# Patient Record
Sex: FEMALE | Race: WHITE | NOT HISPANIC OR LATINO | Employment: OTHER | RURAL
[De-identification: names, ages, dates, MRNs, and addresses within clinical notes are randomized per-mention and may not be internally consistent; named-entity substitution may affect disease eponyms.]

---

## 2018-04-08 PROBLEM — R56.9 SEIZURE: Status: ACTIVE | Noted: 2018-04-08

## 2018-10-18 LAB — BCS RECOMMENDATION EXT: NORMAL

## 2021-11-15 ENCOUNTER — OFFICE VISIT (OUTPATIENT)
Dept: FAMILY MEDICINE | Facility: CLINIC | Age: 66
End: 2021-11-15
Payer: MEDICARE

## 2021-11-15 VITALS
BODY MASS INDEX: 23.75 KG/M2 | OXYGEN SATURATION: 98 % | HEIGHT: 59 IN | HEART RATE: 86 BPM | WEIGHT: 117.81 LBS | TEMPERATURE: 98 F | DIASTOLIC BLOOD PRESSURE: 76 MMHG | SYSTOLIC BLOOD PRESSURE: 127 MMHG | RESPIRATION RATE: 18 BRPM

## 2021-11-15 DIAGNOSIS — E78.2 MIXED HYPERLIPIDEMIA: Primary | ICD-10-CM

## 2021-11-15 PROBLEM — G40.909 SEIZURE DISORDER: Status: ACTIVE | Noted: 2018-12-24

## 2021-11-15 LAB
CHOLEST SERPL-MCNC: 160 MG/DL (ref 0–200)
CHOLEST/HDLC SERPL: 2 {RATIO}
HDLC SERPL-MCNC: 82 MG/DL (ref 40–60)
LDLC SERPL CALC-MCNC: 62 MG/DL
LDLC/HDLC SERPL: 0.8 {RATIO}
NONHDLC SERPL-MCNC: 78 MG/DL
TRIGL SERPL-MCNC: 80 MG/DL (ref 35–150)
VLDLC SERPL-MCNC: 16 MG/DL

## 2021-11-15 PROCEDURE — 80061 LIPID PANEL: CPT | Mod: ,,, | Performed by: CLINICAL MEDICAL LABORATORY

## 2021-11-15 PROCEDURE — 99212 PR OFFICE/OUTPT VISIT, EST, LEVL II, 10-19 MIN: ICD-10-PCS | Mod: ,,, | Performed by: FAMILY MEDICINE

## 2021-11-15 PROCEDURE — 99212 OFFICE O/P EST SF 10 MIN: CPT | Mod: ,,, | Performed by: FAMILY MEDICINE

## 2021-11-15 PROCEDURE — 80061 LIPID PANEL: ICD-10-PCS | Mod: ,,, | Performed by: CLINICAL MEDICAL LABORATORY

## 2021-11-15 RX ORDER — PRAVASTATIN SODIUM 40 MG/1
40 TABLET ORAL DAILY
COMMUNITY
End: 2021-11-15 | Stop reason: SDUPTHER

## 2021-11-15 RX ORDER — NAPROXEN SODIUM 220 MG
220 TABLET ORAL EVERY 12 HOURS PRN
COMMUNITY

## 2021-11-15 RX ORDER — GABAPENTIN 300 MG/1
300 CAPSULE ORAL DAILY
COMMUNITY

## 2021-11-15 RX ORDER — LISINOPRIL 5 MG/1
5 TABLET ORAL DAILY
COMMUNITY
End: 2022-06-03 | Stop reason: SDUPTHER

## 2021-11-15 RX ORDER — DULOXETIN HYDROCHLORIDE 60 MG/1
60 CAPSULE, DELAYED RELEASE ORAL 2 TIMES DAILY
COMMUNITY

## 2021-11-15 RX ORDER — LEVOTHYROXINE SODIUM 75 UG/1
75 TABLET ORAL
COMMUNITY

## 2021-11-15 RX ORDER — PRAVASTATIN SODIUM 40 MG/1
40 TABLET ORAL NIGHTLY
Qty: 90 TABLET | Refills: 3 | Status: SHIPPED | OUTPATIENT
Start: 2021-11-15 | End: 2022-09-01

## 2021-11-15 RX ORDER — AMITRIPTYLINE HYDROCHLORIDE 50 MG/1
50 TABLET, FILM COATED ORAL NIGHTLY
COMMUNITY

## 2021-11-15 RX ORDER — LAMOTRIGINE 150 MG/1
25 TABLET ORAL 2 TIMES DAILY
COMMUNITY

## 2022-03-11 DIAGNOSIS — Z71.89 COMPLEX CARE COORDINATION: ICD-10-CM

## 2022-06-03 ENCOUNTER — OFFICE VISIT (OUTPATIENT)
Dept: FAMILY MEDICINE | Facility: CLINIC | Age: 67
End: 2022-06-03
Payer: MEDICARE

## 2022-06-03 VITALS
SYSTOLIC BLOOD PRESSURE: 138 MMHG | HEART RATE: 70 BPM | WEIGHT: 118.19 LBS | RESPIRATION RATE: 20 BRPM | BODY MASS INDEX: 23.83 KG/M2 | OXYGEN SATURATION: 98 % | HEIGHT: 59 IN | DIASTOLIC BLOOD PRESSURE: 82 MMHG | TEMPERATURE: 97 F

## 2022-06-03 DIAGNOSIS — I10 HYPERTENSION, UNSPECIFIED TYPE: Primary | ICD-10-CM

## 2022-06-03 PROCEDURE — 99213 PR OFFICE/OUTPT VISIT, EST, LEVL III, 20-29 MIN: ICD-10-PCS | Mod: ,,, | Performed by: NURSE PRACTITIONER

## 2022-06-03 PROCEDURE — 99213 OFFICE O/P EST LOW 20 MIN: CPT | Mod: ,,, | Performed by: NURSE PRACTITIONER

## 2022-06-03 RX ORDER — LAMOTRIGINE 25 MG/1
50 TABLET ORAL NIGHTLY
COMMUNITY
Start: 2022-04-13 | End: 2023-07-10

## 2022-06-03 RX ORDER — HYDROXYZINE HYDROCHLORIDE 25 MG/1
25 TABLET, FILM COATED ORAL 4 TIMES DAILY
COMMUNITY
Start: 2022-06-02

## 2022-06-03 NOTE — PROGRESS NOTES
New clinic note    Coco Ramos is a 66 y.o. female     Chief Complaint:   Chief Complaint   Patient presents with    Medication Refill        Subjective:    Patient states she is here for chronic disease follow up and med refill. Patient states all she gets from pcp is htn med. Patient reports neurologist manages other meds. Patient reports bp has been elevated. Patient states at last neuro visit bp was 171/79 (patient brought avs). Patient denies any adverse side effects. Patient denies any complaints or concerns.        Allergies:   Review of patient's allergies indicates:  No Known Allergies     Past Medical History:  Past Medical History:   Diagnosis Date    Anxiety     Hypertension     Hyperthyroidism     Seizures         Current Medications:    Current Outpatient Medications:     amitriptyline (ELAVIL) 50 MG tablet, Take 50 mg by mouth every evening., Disp: , Rfl:     DULoxetine (CYMBALTA) 60 MG capsule, Take 60 mg by mouth 2 (two) times a day., Disp: , Rfl:     gabapentin (NEURONTIN) 300 MG capsule, Take 300 mg by mouth once daily., Disp: , Rfl:     hydrOXYzine HCL (ATARAX) 25 MG tablet, Take 25 mg by mouth 4 (four) times daily., Disp: , Rfl:     lamoTRIgine (LAMICTAL) 150 MG Tab, Take 25 mg by mouth 2 (two) times a day., Disp: , Rfl:     lamoTRIgine (LAMICTAL) 25 MG tablet, Take 50 mg by mouth nightly., Disp: , Rfl:     levothyroxine (SYNTHROID) 75 MCG tablet, Take 75 mcg by mouth before breakfast., Disp: , Rfl:     naproxen sodium (ANAPROX) 220 MG tablet, Take 220 mg by mouth every 12 (twelve) hours as needed., Disp: , Rfl:     pravastatin (PRAVACHOL) 40 MG tablet, Take 1 tablet (40 mg total) by mouth every evening., Disp: 90 tablet, Rfl: 3    lisinopriL 10 MG tablet, Take 1 tablet (10 mg total) by mouth once daily., Disp: 90 tablet, Rfl: 1       Review of Systems   Constitutional: Negative for fever.   Respiratory: Negative for cough and shortness of breath.    Cardiovascular:  "Negative for chest pain.   Gastrointestinal: Negative for abdominal pain.   Neurological: Positive for seizures. Negative for headaches.          Objective:    /82 (BP Location: Right arm, Patient Position: Sitting, BP Method: Large (Manual))   Pulse 70   Temp 97.1 °F (36.2 °C) (Skin)   Resp 20   Ht 4' 11" (1.499 m)   Wt 53.6 kg (118 lb 3.2 oz)   SpO2 98%   BMI 23.87 kg/m²      Physical Exam  Constitutional:       Appearance: Normal appearance.   Eyes:      Extraocular Movements: Extraocular movements intact.   Cardiovascular:      Rate and Rhythm: Normal rate and regular rhythm.      Pulses: Normal pulses.      Heart sounds: Normal heart sounds.   Pulmonary:      Effort: Pulmonary effort is normal.      Breath sounds: Normal breath sounds.   Musculoskeletal:      Right lower leg: No edema.      Left lower leg: No edema.   Neurological:      Mental Status: She is alert and oriented to person, place, and time.          Assessment and Plan:    1. Hypertension, unspecified type         Hypertension, unspecified type  -     Discontinue: lisinopriL (PRINIVIL,ZESTRIL) 5 MG tablet; Take 1 tablet (5 mg total) by mouth once daily.  Dispense: 90 tablet; Refill: 1  -     lisinopriL 10 MG tablet; Take 1 tablet (10 mg total) by mouth once daily.  Dispense: 90 tablet; Refill: 1       bp has been elevated. Mildly elevated at today visit. Will increase lisinopril to 10mg po daily. Encouraged low salt diet. Patient to keep bp record. Patient sees neurologist q3-4 months. If bp consistently elevated 140/90 return for further management. Neurologist collects labs that can be viewed in epic.     There are no Patient Instructions on file for this visit.   Follow up in about 6 months (around 12/3/2022).     "

## 2022-06-06 PROBLEM — G40.909 SEIZURE DISORDER: Chronic | Status: ACTIVE | Noted: 2018-12-24

## 2022-06-06 PROBLEM — I10 HYPERTENSION: Chronic | Status: ACTIVE | Noted: 2022-06-06

## 2022-06-06 PROBLEM — I10 HYPERTENSION: Status: ACTIVE | Noted: 2022-06-06

## 2022-06-06 RX ORDER — LISINOPRIL 10 MG/1
10 TABLET ORAL DAILY
Qty: 90 TABLET | Refills: 1 | Status: SHIPPED | OUTPATIENT
Start: 2022-06-06 | End: 2022-09-01

## 2022-06-06 RX ORDER — LISINOPRIL 5 MG/1
5 TABLET ORAL DAILY
Qty: 90 TABLET | Refills: 1 | Status: SHIPPED | OUTPATIENT
Start: 2022-06-06 | End: 2022-06-06

## 2022-10-09 DIAGNOSIS — Z71.89 COMPLEX CARE COORDINATION: ICD-10-CM

## 2023-03-10 DIAGNOSIS — I10 HYPERTENSION, UNSPECIFIED TYPE: ICD-10-CM

## 2023-03-10 RX ORDER — LISINOPRIL 10 MG/1
10 TABLET ORAL DAILY
Qty: 90 TABLET | Refills: 0 | Status: SHIPPED | OUTPATIENT
Start: 2023-03-10 | End: 2023-07-10 | Stop reason: SDUPTHER

## 2023-03-10 NOTE — TELEPHONE ENCOUNTER
----- Message from Ranjit Price sent at 3/10/2023 10:09 AM CST -----  Regarding: MED REFILL  lisinopriL 10 MG tablet PT NEED THIS MED TO BE REFILL AND SEND TO [facility] ([facility]) - [place], [place] - [address]

## 2023-05-09 DIAGNOSIS — Z71.89 COMPLEX CARE COORDINATION: ICD-10-CM

## 2023-05-11 ENCOUNTER — PATIENT OUTREACH (OUTPATIENT)
Dept: ADMINISTRATIVE | Facility: HOSPITAL | Age: 68
End: 2023-05-11

## 2023-06-12 ENCOUNTER — PATIENT OUTREACH (OUTPATIENT)
Dept: ADMINISTRATIVE | Facility: HOSPITAL | Age: 68
End: 2023-06-12

## 2023-06-12 NOTE — LETTER
June 12, 2023     Coco Ramos  [address] [place] [address]         Dear Mrs. Avery Rachel:      Your [facility] primary care team is dedicated to assisting you achieve your health goals.  Scheduling your routine screenings and tests are key to your good health.  Our records indicate you may be overdue for your screening mammogram.  Mammography screening can help find breast cancer at an early stage, when it is most likely to be successfully treated.    We encourage you to schedule your appointment at any of our [facility] imaging locations.     If you recently had your mammogram outside of [facility], please notify your primary care team so we can update your health record.    If you have questions or want to schedule your screening mammogram, please contact your primary care clinic at [phone].      Sincerely,      Your [facility] Primary Care Team

## 2023-06-12 NOTE — PROGRESS NOTES
Gap report for [facility] mammogram reports. Past due on mammogram. NO upcoming appt scheduled at this time. Letter mailed to patient about scheduling mammo appt.

## 2023-07-10 ENCOUNTER — OFFICE VISIT (OUTPATIENT)
Dept: FAMILY MEDICINE | Facility: CLINIC | Age: 68
End: 2023-07-10
Payer: MEDICARE

## 2023-07-10 VITALS
SYSTOLIC BLOOD PRESSURE: 160 MMHG | HEIGHT: 59 IN | BODY MASS INDEX: 25.44 KG/M2 | TEMPERATURE: 98 F | OXYGEN SATURATION: 95 % | WEIGHT: 126.19 LBS | RESPIRATION RATE: 18 BRPM | DIASTOLIC BLOOD PRESSURE: 68 MMHG | HEART RATE: 85 BPM

## 2023-07-10 DIAGNOSIS — G40.909 SEIZURE DISORDER: Chronic | ICD-10-CM

## 2023-07-10 DIAGNOSIS — I10 HYPERTENSION, UNSPECIFIED TYPE: Primary | ICD-10-CM

## 2023-07-10 DIAGNOSIS — R29.6 FALLS FREQUENTLY: ICD-10-CM

## 2023-07-10 DIAGNOSIS — R26.81 UNSTEADY GAIT: ICD-10-CM

## 2023-07-10 PROCEDURE — 99213 PR OFFICE/OUTPT VISIT, EST, LEVL III, 20-29 MIN: ICD-10-PCS | Mod: ,,, | Performed by: NURSE PRACTITIONER

## 2023-07-10 PROCEDURE — 99213 OFFICE O/P EST LOW 20 MIN: CPT | Mod: ,,, | Performed by: NURSE PRACTITIONER

## 2023-07-10 RX ORDER — LISINOPRIL 20 MG/1
20 TABLET ORAL DAILY
Qty: 90 TABLET | Refills: 1 | Status: SHIPPED | OUTPATIENT
Start: 2023-07-10 | End: 2023-10-12 | Stop reason: SDUPTHER

## 2023-07-10 RX ORDER — LISINOPRIL 20 MG/1
20 TABLET ORAL DAILY
Qty: 10 TABLET | Refills: 0 | Status: SHIPPED | OUTPATIENT
Start: 2023-07-10 | End: 2023-08-08 | Stop reason: SDUPTHER

## 2023-07-10 RX ORDER — LANOLIN ALCOHOL/MO/W.PET/CERES
1 CREAM (GRAM) TOPICAL DAILY
COMMUNITY

## 2023-07-10 NOTE — PROGRESS NOTES
Clinic Note    Coco Ramos is a 67 y.o. female     Chief Complaint:   Chief Complaint   Patient presents with    Fall     Pt states that her body has been steadily leaning to the right x 2 months, she doesn't notice it as much as her spouse.      Medication Refill        Subjective:    Patient states she needs refill on lisinopril. States she took her last pill today. States she needs short term supply to [facility] and mail rx to other pharmacy. Patient admits to monitoring bp at home. States bp has been elevated in 160s. Denies diet or exercise. Denies headaches, shortness of breath, or chest pain.  Patient states she has had increase falls the past 3 months. Admits to falling multiple times. Denies any serious injuries. States she has started using a walking stick. Patient reports her spouse states she leans to the right, worse in am. Patient does not notice leaning. Patient admits to hx of seizures. Patient sees neurologist routinely. Patient requesting therapy.       Allergies:   Review of patient's allergies indicates:  No Known Allergies     Past Medical History:  Past Medical History:   Diagnosis Date    Anxiety     Hypertension     Hyperthyroidism     Seizures         Current Medications:    Current Outpatient Medications:     amitriptyline (ELAVIL) 50 MG tablet, Take 50 mg by mouth every evening., Disp: , Rfl:     DULoxetine (CYMBALTA) 60 MG capsule, Take 60 mg by mouth 2 (two) times a day., Disp: , Rfl:     gabapentin (NEURONTIN) 300 MG capsule, Take 300 mg by mouth once daily., Disp: , Rfl:     hydrOXYzine HCL (ATARAX) 25 MG tablet, Take 25 mg by mouth 4 (four) times daily., Disp: , Rfl:     lamoTRIgine (LAMICTAL) 150 MG Tab, Take 25 mg by mouth 2 (two) times a day., Disp: , Rfl:     levothyroxine (SYNTHROID) 75 MCG tablet, Take 75 mcg by mouth before breakfast., Disp: , Rfl:     lisinopriL (PRINIVIL,ZESTRIL) 20 MG tablet, Take 1 tablet (20 mg total) by mouth once daily., Disp: 90 tablet, Rfl:  "1    lisinopriL (PRINIVIL,ZESTRIL) 20 MG tablet, Take 1 tablet (20 mg total) by mouth once daily., Disp: 10 tablet, Rfl: 0    magnesium oxide (MAG-OX) 400 mg (241.3 mg magnesium) tablet, Take 1 tablet by mouth once daily., Disp: , Rfl:     naproxen sodium (ANAPROX) 220 MG tablet, Take 220 mg by mouth every 12 (twelve) hours as needed., Disp: , Rfl:     pravastatin (PRAVACHOL) 40 MG tablet, Take 1 tablet (40 mg total) by mouth every evening., Disp: 90 tablet, Rfl: 1       Review of Systems   Constitutional:  Negative for fever.   Eyes:  Negative for visual disturbance.   Respiratory:  Negative for cough and shortness of breath.    Cardiovascular:  Negative for chest pain.   Gastrointestinal:  Negative for abdominal pain.   Genitourinary:  Negative for dysuria.   Musculoskeletal:  Positive for gait problem.   Neurological:  Positive for coordination difficulties and coordination difficulties. Negative for syncope and headaches.        Objective:    BP (!) 160/68 (BP Location: Left arm, Patient Position: Sitting, BP Method: Large (Manual))   Pulse 85   Temp 98.4 °F (36.9 °C) (Oral)   Resp 18   Ht 4' 11" (1.499 m)   Wt 57.2 kg (126 lb 3.2 oz)   SpO2 95%   BMI 25.49 kg/m²      Physical Exam  Eyes:      Extraocular Movements: Extraocular movements intact.      Pupils: Pupils are equal, round, and reactive to light.   Cardiovascular:      Rate and Rhythm: Normal rate and regular rhythm.      Pulses: Normal pulses.      Heart sounds: Normal heart sounds.   Pulmonary:      Effort: Pulmonary effort is normal.      Breath sounds: Normal breath sounds.   Abdominal:      Palpations: Abdomen is soft.      Tenderness: There is no abdominal tenderness. There is no guarding or rebound.   Musculoskeletal:      Comments: Straight cane   Neurological:      General: No focal deficit present.      Mental Status: She is alert and oriented to person, place, and time.      Gait: Gait abnormal.        Assessment and Plan:    1. " Hypertension, unspecified type    2. Unsteady gait    3. Falls frequently    4. Seizure disorder         Hypertension, unspecified type  -     lisinopriL (PRINIVIL,ZESTRIL) 20 MG tablet; Take 1 tablet (20 mg total) by mouth once daily.  Dispense: 90 tablet; Refill: 1  -     lisinopriL (PRINIVIL,ZESTRIL) 20 MG tablet; Take 1 tablet (20 mg total) by mouth once daily.  Dispense: 10 tablet; Refill: 0  -increase to 20mg vs 10mg  -low salt diet  -check bp at home and bring log to f/u    Unsteady gait  -     Ambulatory referral/consult to Physical/Occupational Therapy; Future; Expected date: 07/17/2023  -refer to therapy at [facility]  -f/u with neurologist as scheduled or sooner if needed    Falls frequently  -     Ambulatory referral/consult to Physical/Occupational Therapy; Future; Expected date: 07/17/2023    Seizure disorder  -     Ambulatory referral/consult to Physical/Occupational Therapy; Future; Expected date: 07/17/2023      -care gaps discussed. Refused all care gaps.    There are no Patient Instructions on file for this visit.   Follow up in about 4 weeks (around 8/7/2023), or if symptoms worsen or fail to improve.

## 2023-07-17 ENCOUNTER — PATIENT OUTREACH (OUTPATIENT)
Dept: ADMINISTRATIVE | Facility: HOSPITAL | Age: 68
End: 2023-07-17

## 2023-07-17 NOTE — PROGRESS NOTES
Gap report on blood pressure control. Last Bp was 160/68. Next appt is on 8/14/2023. Comment placed in chart and upcoming appt that Bp needs to be less than 140/90.    Need Mammogram. Comment placed in chart and upcoming appt that pt needs this.     No documentation found in [facility], [facility] [employer], or Care Everywhere for c-scope. Comment placed in chart and upcoming appt that pt needs this.

## 2023-07-24 ENCOUNTER — CLINICAL SUPPORT (OUTPATIENT)
Dept: REHABILITATION | Facility: HOSPITAL | Age: 68
End: 2023-07-24
Payer: MEDICARE

## 2023-07-24 DIAGNOSIS — G40.909 SEIZURE DISORDER: Chronic | ICD-10-CM

## 2023-07-24 DIAGNOSIS — R29.6 FALLS FREQUENTLY: ICD-10-CM

## 2023-07-24 DIAGNOSIS — R26.81 UNSTEADY GAIT: Primary | ICD-10-CM

## 2023-07-24 PROCEDURE — 97162 PT EVAL MOD COMPLEX 30 MIN: CPT

## 2023-07-24 NOTE — PLAN OF CARE
[facility]   Physical Therapy Initial Evaluation      Name: Coco Ramos  Clinic Number: [id number]    Therapy Diagnosis:   Encounter Diagnoses   Name Primary?    Unsteady gait Yes    Falls frequently     Seizure disorder         Physician: Holli Dean FNP    Physician Orders: PT Eval and Treat   Medical Diagnosis from Referral: R26.81 (ICD-10-CM) - Unsteady gait  R29.6 (ICD-10-CM) - Falls frequently  G40.909 (ICD-10-CM) - Seizure disorder  Evaluation Date: 2023  Authorization Period Expiration: 23  Plan of Care Expiration: 23  Progress Note Due: 10th visit  Visit # / Visits authorized: 1/ 10       Precautions: Fall     Time In: 1008  Time Out: 1033  Total Appointment Time (timed & untimed codes): 25 minutes    Subjective     Date of onset:     History of current condition - Coco reports: she has had greater than two months of unsteady gait and loss of balance with frequent falls. Reports she last fell yesterday. 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    Falls: yes on 22    Imaging: MRI studies: but report not available    Prior Therapy: no  Social History:  lives with their spouse  Occupation: retired  Prior Level of Function: independent  Current Level of Function: independent    Pain:  Current 3/10, worst 3/10, best 0/10   Location: right neck and back    Description: Dull  Aggravating Factors: occurred when she fell  Easing Factors: nothing    Patients goals: to help me with my balance     Medical History:   Past Medical History:   Diagnosis Date    Anxiety     Hypertension     Hyperthyroidism     Seizures        Surgical History:   Coco Ramos  has a past surgical history that includes  section.    Medications:   Coco has a current medication list which includes the following prescription(s): amitriptyline, duloxetine, gabapentin, hydroxyzine hcl, lamotrigine, levothyroxine, lisinopril,  lisinopril, magnesium oxide, naproxen sodium, and pravastatin.    Allergies:   Review of patient's allergies indicates:  No Known Allergies     Objective          Range of motion:  Motion Right Left    Hip flexion  WITHIN FUNCTIONAL LIMITS  WITHIN FUNCTIONAL LIMITS   Hip extension  WITHIN FUNCTIONAL LIMITS  WITHIN FUNCTIONAL LIMITS   Hip abduction  WITHIN FUNCTIONAL LIMITS  WITHIN FUNCTIONAL LIMITS   Hip adduction  WITHIN FUNCTIONAL LIMITS  WITHIN FUNCTIONAL LIMITS   Internal rotation  WITHIN FUNCTIONAL LIMITS  WITHIN FUNCTIONAL LIMITS   External rotation  WITHIN FUNCTIONAL LIMITS  WITHIN FUNCTIONAL LIMITS   Knee extension  WITHIN FUNCTIONAL LIMITS  WITHIN FUNCTIONAL LIMITS   Knee flexion  WITHIN FUNCTIONAL LIMITS  WITHIN FUNCTIONAL LIMITS   Ankle DF  WITHIN FUNCTIONAL LIMITS  WITHIN FUNCTIONAL LIMITS   Ankle PF  WITHIN FUNCTIONAL LIMITS  WITHIN FUNCTIONAL LIMITS   Ankle Inversion  WITHIN FUNCTIONAL LIMITS  WITHIN FUNCTIONAL LIMITS   Ankle Eversion  WITHIN FUNCTIONAL LIMITS  WITHIN FUNCTIONAL LIMITS       Manual muscle test   Muscle Right  Left    Hip flexion  MMT strength: 4/5  MMT strength: 4/5   Hip extension  MMT strength: 3-/5  MMT strength: 3-/5   Hip abduction  MMT strength: 3-/5  MMT strength: 3-/5   Hip adduction  MMT strength: 4/5  MMT strength: 4/5   Hip internal rotation  MMT strength: 4/5  MMT strength: 4/5   Hip external rotation  MMT strength: 4/5  MMT strength: 4/5   Knee extension  MMT strength: 5/5  MMT strength: 5/5   Knee flexion  MMT strength: 5/5  MMT strength: 5/5   Ankle DF  MMT strength: 4/5  MMT strength: 4/5   Ankle PF  MMT strength: 3+/5  MMT strength: 3+/5   Ankle inversion  MMT strength: 3+/5  MMT strength: 3+/5   Ankle eversion  MMT strength: 3+/5  MMT strength: 3+/5       Gait Analysis  Weight bearing precautions: FWB  Assistive device: none  Heel strike: yes  Reciprocal steps yes  Swing phase right: yes  Swing phase left: yes    Comments:      Clinical Special Tests:  Leg length:  negative  JESS: negative    Tinetti Gait and Balance Score: 24/28    4 Stage Balance Test (time in seconds for each position)    Standing with feet: WNL  Instep standing: WNL  Tandem stand: 5 SECONDS  Single leg stand right:  2 SECONDS          left: 2 SECONDS    TUG Balance Test: 13 SECONDS  Time in seconds:   (An older adult who takes ?12 seconds to complete the TUG is at risk for falling.)    Gait Deviations Check all that apply:  Slow tentative pace: YES  Loss of balance: YES  Short strides: NO  Little or no arm swing: YES  Steadying self on walls: NO  Shuffling: NO  En bloc turning: NO  Not using assistive device properly: N/A    Comments:    Limitation/Restriction for FOTO not taken Survey    Therapist reviewed FOTO scores for Coco Ramos on 7/24/2023.   FOTO documents entered into [employer] - see Media section.    Limitation Score: not taken           Patient Education and Home Exercises     Education provided:   - plan of care discussed with patient.      Assessment     Coco is a 68 y.o. female referred to outpatient Physical Therapy with a medical diagnosis of R26.81 (ICD-10-CM) - Unsteady gait  R29.6 (ICD-10-CM) - Falls frequently  G40.909 (ICD-10-CM) - Seizure disorder. Patient presents with decreased balance and muscle weakness.    Patient prognosis is Good.   Patient will benefit from skilled outpatient Physical Therapy to address the deficits stated above and in the chart below, provide patient /family education, and to maximize patientt's level of independence.     Plan of care discussed with patient: Yes  Patient's spiritual, cultural and educational needs considered and patient is agreeable to the plan of care and goals as stated below:     Anticipated Barriers for therapy: none    Medical Necessity is demonstrated by the following  History  Co-morbidities and personal factors that may impact the plan of care [] LOW: no personal factors / co-morbidities  [x] MODERATE: 1-2 personal factors /  co-morbidities  [] HIGH: 3+ personal factors / co-morbidities    Moderate / High Support Documentation:   Co-morbidities affecting plan of care:   Past Medical History:   Diagnosis Date    Anxiety     Hypertension     Hyperthyroidism     Seizures        Personal Factors:   no deficits     Examination  Body Structures and Functions, activity limitations and participation restrictions that may impact the plan of care [] LOW: addressing 1-2 elements  [x] MODERATE: 3+ elements  [] HIGH: 4+ elements (please support below)    Moderate / High Support Documentation: STRENGTH, BALANCE, GAIT, ROM     Clinical Presentation [] LOW: stable  [x] MODERATE: Evolving  [] HIGH: Unstable     Decision Making/ Complexity Score: moderate       Goals:  Short Term Goals: 3 weeks   Patient will demonstrate independent performance of home exercises program.  Patient will improve Timed Up and Go score to 12 seconds or less to reduce fall risks.  Patient will improve  4 Stage Balance Test Single leg stand to 5 seconds to reduce fall risks.    Long Term Goals: 5 weeks   Patient will increase bilateral hip muscle strength to 4/5 to improve balance and functional activity.  Patient will tolerate 35 minutes or greater of exercise/activity with 0/10 pain to improve endurance for ADL's.    Plan     Plan of care Certification: 7/24/2023 to 9/1/23.    Outpatient Physical Therapy 2 times weekly for 5 weeks to include the following interventions: Gait Training, Neuromuscular Re-ed, Patient Education, Therapeutic Activities, and Therapeutic Exercise.     Smooth Marcus, PT

## 2023-08-01 ENCOUNTER — CLINICAL SUPPORT (OUTPATIENT)
Dept: REHABILITATION | Facility: HOSPITAL | Age: 68
End: 2023-08-01
Payer: MEDICARE

## 2023-08-01 DIAGNOSIS — R26.81 UNSTEADY GAIT: Primary | ICD-10-CM

## 2023-08-01 DIAGNOSIS — R29.6 FALLS FREQUENTLY: ICD-10-CM

## 2023-08-01 PROCEDURE — 97110 THERAPEUTIC EXERCISES: CPT | Mod: CQ

## 2023-08-01 NOTE — PROGRESS NOTES
[facility]   Physical Therapy Treatment Note      Name: Coco Ramos  Clinic Number: [id number]     Therapy Diagnosis:        Encounter Diagnoses   Name Primary?    Unsteady gait Yes    Falls frequently      Seizure disorder          Physician: Holli Dean FNP     Physician Orders: PT Eval and Treat   Medical Diagnosis from Referral: R26.81 (ICD-10-CM) - Unsteady gait  R29.6 (ICD-10-CM) - Falls frequently  G40.909 (ICD-10-CM) - Seizure disorder  Evaluation Date: 7/24/2023  Authorization Period Expiration: 9/1/23  Plan of Care Expiration: 9/1/23  Progress Note Due: 10th visit  Visit # 2/ 10  Visits authorized: 1/ 10   PTA Visits: 1/5     Precautions: Fall      Time In: 1011  Time Out: 1049  Total Appointment Time (timed & untimed codes): 38 minutes       Subjective     Pt reports: Pain and weakness of R hip and Knee.  Coco Will become compliant with home exercise program.  Response to previous treatment: Patient tolerated treatment well   Functional change: N/A    Pain: 4/10  Location: right knee      Objective      Objective Measures updated at progress report unless specified.     Treatment     Coco received the treatments listed below:      therapeutic exercises to develop strength, ROM, and Flexibility for 38 minutes including:  Scifit machine 6 minutes  Standing hip flexion, abduction, and heel raises 2 x 10   Seated hip abduction with red tband 2 x 10  Seated hip adduction with ball 2 x 10   Seated hip flexion 2 x 10  Seated LAQ 2 x 10  Bilateral Supine Straight Leg raises x 10  Bilateral Supine Hip abduction x 10   Seated bilateral Hamstring stretch x 10    Patient Education and Home Exercises         Written Home Exercises Provided: yes. Exercises were reviewed and Coco was able to demonstrate them prior to the end of the session. Coco demonstrated good understanding of the education provided. See EMR under Patient Instructions for exercises provided during  therapy sessions.     Assessment     Patient had to be verbally cued to continue with exercises. She was slightly confused during treatment often starting on a new exercises when currently performing one spontaneously. Patient also has a posterior lean when ambulating.    Coco Is progressing well towards her goals.     Pt prognosis is Good.     Pt will continue to benefit from skilled outpatient physical therapy to address the deficits listed in the problem list box on initial evaluation, provide pt/family education and to maximize pt's level of independence in the home and community environment.     Pt's spiritual, cultural and educational needs considered and pt agreeable to plan of care and goals.     Anticipated barriers to physical therapy: none    Goals:   Goals:  Short Term Goals: 3 weeks   Patient will demonstrate independent performance of home exercises program.  Patient will improve Timed Up and Go score to 12 seconds or less to reduce fall risks.  Patient will improve  4 Stage Balance Test Single leg stand to 5 seconds to reduce fall risks.     Long Term Goals: 5 weeks   Patient will increase bilateral hip muscle strength to 4/5 to improve balance and functional activity.  Patient will tolerate 35 minutes or greater of exercise/activity with 0/10 pain to improve endurance for ADL's.            Plan      Outpatient Physical Therapy 2 times weekly for 5 weeks to include the following interventions: Gait Training, Neuromuscular Re-ed, Patient Education, Therapeutic Activities, and Therapeutic Exercise.        Dc Sanders, PTA

## 2023-08-03 ENCOUNTER — CLINICAL SUPPORT (OUTPATIENT)
Dept: REHABILITATION | Facility: HOSPITAL | Age: 68
End: 2023-08-03
Payer: MEDICARE

## 2023-08-03 DIAGNOSIS — R26.81 UNSTEADY GAIT: Primary | ICD-10-CM

## 2023-08-03 DIAGNOSIS — R29.6 FALLS FREQUENTLY: ICD-10-CM

## 2023-08-03 PROCEDURE — 97112 NEUROMUSCULAR REEDUCATION: CPT | Mod: CQ

## 2023-08-03 PROCEDURE — 97110 THERAPEUTIC EXERCISES: CPT | Mod: CQ

## 2023-08-03 NOTE — PROGRESS NOTES
[facility]   Physical Therapy Treatment Note      Name: Coco Ramos  Clinic Number: [id number]     Therapy Diagnosis:        Encounter Diagnoses   Name Primary?    Unsteady gait Yes    Falls frequently      Seizure disorder          Physician: Holli Dean FNP     Physician Orders: PT Eval and Treat   Medical Diagnosis from Referral: R26.81 (ICD-10-CM) - Unsteady gait  R29.6 (ICD-10-CM) - Falls frequently  G40.909 (ICD-10-CM) - Seizure disorder  Evaluation Date: 7/24/2023  Authorization Period Expiration: 9/1/23  Plan of Care Expiration: 9/1/23  Progress Note Due: 10th visit  Visit # 3/ 10  Visits authorized: 1/ 10   PTA Visits: 2/5     Precautions: Fall, standard     Time In: 1017  Time Out: 1058  Total Appointment Time (timed & untimed codes): 41 minutes       Subjective     Pt reports: Pain and weakness of R hip and Knee. She says she did a lot of walking and that aided in her pain this morning before therapy. She did not complete HEP on yesterday (8/2/23) due to her being tired from walking around all day.  Coco Will become compliant with home exercise program.  Response to previous treatment: Patient tolerated treatment well   Functional change: N/A    Pain: 5/10  Location: right knee      Objective      Objective Measures updated at progress report unless specified.     Treatment     Coco received the treatments listed below:      therapeutic exercises to develop strength, ROM, and Flexibility for 23 minutes including:  Scifit machine 6 minutes 1.5 resistance   Standing hip flexion, abduction, extension, mini squats and heel raises 2 x 10   Seated hip abduction with red tband 2 x 10  Seated hip adduction with ball 2 x 10   Seated hip flexion 2 x 10  Seated LAQ 2 x 10  Bilateral Supine Straight Leg raises 2 x10  Bilateral Supine Hip abduction 2 x10   Hook lying Bridge 2 x 10   Seated bilateral Hamstring stretch x 10    Neuromuscular Reeducation to develop balance,  "core strength and coordination for 18 minutes including:  Toe taps on 4" step 2 x 10  Step ups on foam pad 2 x 10   Sitting posture holds x 2 reps of 1 minutes        Patient Education and Home Exercises       Education Provided: Patient was cued on using correct body mechanics and correct execution of exercises to facilitate maximal muscle contraction.     Written Home Exercises Provided: yes. Exercises were reviewed and Coco was able to demonstrate them prior to the end of the session. Coco demonstrated good understanding of the education provided. See EMR under Patient Instructions for exercises provided during therapy sessions.     Assessment     Patient had to be verbally cued to continue with exercises. She continues to be slightly confused during treatment often starting on a new exercises when currently performing one spontaneously. Patient also has a posterior lean and shortened step length. She presents to therapy today with a cane. Education was provided on how to properly use cane and was adjusted correctly for optimal assistance. Patient exhibits significant weakness in gastroc/soleus when performing standing heel raises in the eccentric phase while exhibiting R sided trunk lean.    Coco Is progressing well towards her goals.     Pt prognosis is Good.     Pt will continue to benefit from skilled outpatient physical therapy to address the deficits listed in the problem list box on initial evaluation, provide pt/family education and to maximize pt's level of independence in the home and community environment.     Pt's spiritual, cultural and educational needs considered and pt agreeable to plan of care and goals.     Anticipated barriers to physical therapy: none    Goals:   Goals:  Short Term Goals: 3 weeks   Patient will demonstrate independent performance of home exercises program.  Patient will improve Timed Up and Go score to 12 seconds or less to reduce fall risks.  Patient will improve  4 Stage " Balance Test Single leg stand to 5 seconds to reduce fall risks.     Long Term Goals: 5 weeks   Patient will increase bilateral hip muscle strength to 4/5 to improve balance and functional activity.  Patient will tolerate 35 minutes or greater of exercise/activity with 0/10 pain to improve endurance for ADL's.            Plan      Continue with Plan of Care     Dc Sanders, PTA

## 2023-08-08 ENCOUNTER — CLINICAL SUPPORT (OUTPATIENT)
Dept: REHABILITATION | Facility: HOSPITAL | Age: 68
End: 2023-08-08
Payer: MEDICARE

## 2023-08-08 ENCOUNTER — OFFICE VISIT (OUTPATIENT)
Dept: FAMILY MEDICINE | Facility: CLINIC | Age: 68
End: 2023-08-08
Payer: MEDICARE

## 2023-08-08 VITALS
TEMPERATURE: 98 F | DIASTOLIC BLOOD PRESSURE: 79 MMHG | WEIGHT: 128.38 LBS | BODY MASS INDEX: 25.88 KG/M2 | HEART RATE: 61 BPM | OXYGEN SATURATION: 96 % | SYSTOLIC BLOOD PRESSURE: 136 MMHG | HEIGHT: 59 IN | RESPIRATION RATE: 17 BRPM

## 2023-08-08 DIAGNOSIS — R26.81 UNSTEADY GAIT: Primary | ICD-10-CM

## 2023-08-08 DIAGNOSIS — R26.81 UNSTEADY GAIT: ICD-10-CM

## 2023-08-08 DIAGNOSIS — I10 HYPERTENSION, UNSPECIFIED TYPE: Primary | Chronic | ICD-10-CM

## 2023-08-08 DIAGNOSIS — R29.6 FALLS FREQUENTLY: ICD-10-CM

## 2023-08-08 DIAGNOSIS — G40.909 SEIZURE DISORDER: Chronic | ICD-10-CM

## 2023-08-08 PROCEDURE — 97112 NEUROMUSCULAR REEDUCATION: CPT | Mod: CQ

## 2023-08-08 PROCEDURE — 97110 THERAPEUTIC EXERCISES: CPT | Mod: CQ

## 2023-08-08 PROCEDURE — 99213 OFFICE O/P EST LOW 20 MIN: CPT | Mod: ,,, | Performed by: NURSE PRACTITIONER

## 2023-08-08 PROCEDURE — 99213 PR OFFICE/OUTPT VISIT, EST, LEVL III, 20-29 MIN: ICD-10-PCS | Mod: ,,, | Performed by: NURSE PRACTITIONER

## 2023-08-08 NOTE — PROGRESS NOTES
Clinic Note    Coco Ramos is a 68 y.o. female     Chief Complaint:   Chief Complaint   Patient presents with    Follow-up     Follow up for hypertension     Leg Pain     Patient states leg pain is constant    Fall     Patient had a fall 8/7/23. Patient states she fell on her knees and is currently experiencing discomfort and pain.         Subjective:    Patient here for 4 week follow up on htn. Lisinopril was increased at previous visit to 20mg. Patient denies any adverse side effects. Patient reports she monitors bp at home and is it controlled.   Patient is currently participating with physical therapy. Reports improvements. Patient admits to falls at times. Denies any serious injuries or pain.  Patient would like handicap parking decal.   Denies any complaints or concerns.     Follow-up  Pertinent negatives include no abdominal pain, chest pain, coughing or fever.   Leg Pain     Fall  Pertinent negatives include no abdominal pain or fever.        Allergies:   Review of patient's allergies indicates:  No Known Allergies     Past Medical History:  Past Medical History:   Diagnosis Date    Anxiety     Hypertension     Hyperthyroidism     Seizures         Current Medications:    Current Outpatient Medications:     amitriptyline (ELAVIL) 50 MG tablet, Take 50 mg by mouth every evening., Disp: , Rfl:     DULoxetine (CYMBALTA) 60 MG capsule, Take 60 mg by mouth 2 (two) times a day., Disp: , Rfl:     gabapentin (NEURONTIN) 300 MG capsule, Take 300 mg by mouth once daily., Disp: , Rfl:     hydrOXYzine HCL (ATARAX) 25 MG tablet, Take 25 mg by mouth 4 (four) times daily., Disp: , Rfl:     lamoTRIgine (LAMICTAL) 150 MG Tab, Take 25 mg by mouth 2 (two) times a day., Disp: , Rfl:     levothyroxine (SYNTHROID) 75 MCG tablet, Take 75 mcg by mouth before breakfast., Disp: , Rfl:     lisinopriL (PRINIVIL,ZESTRIL) 20 MG tablet, Take 1 tablet (20 mg total) by mouth once daily., Disp: 90 tablet, Rfl: 1    magnesium oxide  "(MAG-OX) 400 mg (241.3 mg magnesium) tablet, Take 1 tablet by mouth once daily., Disp: , Rfl:     naproxen sodium (ANAPROX) 220 MG tablet, Take 220 mg by mouth every 12 (twelve) hours as needed., Disp: , Rfl:     pravastatin (PRAVACHOL) 40 MG tablet, Take 1 tablet (40 mg total) by mouth every evening., Disp: 90 tablet, Rfl: 1       Review of Systems   Constitutional:  Negative for fever.   Respiratory:  Negative for cough and shortness of breath.    Cardiovascular:  Negative for chest pain.   Gastrointestinal:  Negative for abdominal pain.   Musculoskeletal:  Positive for gait problem. Negative for leg pain.          Objective:    /79 (BP Location: Left arm, Patient Position: Sitting, BP Method: Medium (Automatic))   Pulse 61   Temp 97.5 °F (36.4 °C) (Oral)   Resp 17   Ht 4' 11" (1.499 m)   Wt 58.2 kg (128 lb 6.4 oz)   SpO2 96%   BMI 25.93 kg/m²      Physical Exam  Constitutional:       Appearance: Normal appearance.   Eyes:      Extraocular Movements: Extraocular movements intact.   Cardiovascular:      Rate and Rhythm: Normal rate and regular rhythm.      Pulses: Normal pulses.      Heart sounds: Normal heart sounds.   Pulmonary:      Effort: Pulmonary effort is normal.      Breath sounds: Normal breath sounds.   Musculoskeletal:      Right lower leg: No edema.      Left lower leg: No edema.   Skin:     General: Skin is warm and dry.   Neurological:      Mental Status: She is alert.      Gait: Gait abnormal.          Assessment and Plan:    1. Hypertension, unspecified type    2. Unsteady gait    3. Seizure disorder         Hypertension, unspecified type  -bp improved.  -continue current med regimen  -low salt diet and exercise    Unsteady gait  -continue therapy    Seizure disorder    -handicap parking form completed and signed  -copy scanned to chart      There are no Patient Instructions on file for this visit.   Follow up in about 3 months (around 11/8/2023).     "

## 2023-08-08 NOTE — PROGRESS NOTES
"[facility]   Physical Therapy Treatment Note      Name: oCco Ramos  Clinic Number: [id number]     Therapy Diagnosis:        Encounter Diagnoses   Name Primary?    Unsteady gait Yes    Falls frequently      Seizure disorder          Physician: Holli Dean FNP     Physician Orders: PT Eval and Treat   Medical Diagnosis from Referral: R26.81 (ICD-10-CM) - Unsteady gait  R29.6 (ICD-10-CM) - Falls frequently  G40.909 (ICD-10-CM) - Seizure disorder  Evaluation Date: 7/24/2023  Authorization Period Expiration: 9/1/23  Plan of Care Expiration: 9/1/23  Progress Note Due: 10th visit  Visit # 4/11  PTA Visits: 3/5     Precautions: Fall, standard     Time In: 1020  Time Out: 1101  Total Appointment Time (timed & untimed codes):  minutes       Subjective     Pt reports: Pt reports she feels her walking is getting much better and able to do some of her exercises at home fine.   Coco Will become compliant with home exercise program. I fell yesterday at home so both knees and hips are sore. When I leave here, I am going to court house and getting a handicap sticker for my car.   Response to previous treatment: Good.   Functional change: None    Pain: 4/10  Location: bilateral knees       Objective          Treatment     Coco received the treatments listed below:      Therapeutic exercises to develop strength, ROM, and flexibility for 31 minutes including:  Scifit machine 6 minutes 1.7 resistance   Standing hip flexion, abduction, extension, mini squats and heel raises 2 x 10   Seated hip adduction with ball 2 x 10 3 s/h   Seated LAQ 2 x 10  Bilateral Supine Straight Leg raises 2 x 10 and hip abduction 1 x 10 due to bilateral hip pain during these  Supine bridges 2 x 10   Supine hip abduction red tband 2 x 10   Supine bilateral Hamstring stretch 5 x 10 s/h     Neuromuscular re-education to develop balance, core strength, and coordination for 10 minutes including:  Toe taps on 4" step 2 x " 10  Step ups on foam pad 2 x 10   Sitting posture holds x 2 reps of 1 minutes        Patient Education and Home Exercises       Education Provided: Patient was cued on using correct body mechanics and correct execution of exercises to facilitate maximal muscle contraction.     Written Home Exercises Provided: yes. Exercises were reviewed and Coco was able to demonstrate them prior to the end of the session. Coco demonstrated good understanding of the education provided. See EMR under Patient Instructions for exercises provided during therapy sessions.     Assessment     Patient tolerated all exercises fairly well without extreme difficulty with mild c/o hip discomfort during side lying hip abduction so reps were decreased to 1 set today. Pt demo improved postural control during seated and standing activity.     Coco Is progressing well towards her goals.     Pt prognosis is Good.     Pt will continue to benefit from skilled outpatient physical therapy to address the deficits listed in the problem list box on initial evaluation, provide pt/family education and to maximize pt's level of independence in the home and community environment.     Pt's spiritual, cultural and educational needs considered and pt agreeable to plan of care and goals.     Anticipated barriers to physical therapy: none    Goals:   Goals:  Short Term Goals: 3 weeks   Patient will demonstrate independent performance of home exercises program.  Patient will improve Timed Up and Go score to 12 seconds or less to reduce fall risks.  Patient will improve  4 Stage Balance Test Single leg stand to 5 seconds to reduce fall risks.     Long Term Goals: 5 weeks   Patient will increase bilateral hip muscle strength to 4/5 to improve balance and functional activity.  Patient will tolerate 35 minutes or greater of exercise/activity with 0/10 pain to improve endurance for ADL's.            Plan      Continue with current Plan of Care.     Melvi Carpenter  PTA

## 2023-08-10 ENCOUNTER — CLINICAL SUPPORT (OUTPATIENT)
Dept: REHABILITATION | Facility: HOSPITAL | Age: 68
End: 2023-08-10
Payer: MEDICARE

## 2023-08-10 DIAGNOSIS — R26.81 UNSTEADY GAIT: Primary | ICD-10-CM

## 2023-08-10 DIAGNOSIS — R29.6 FALLS FREQUENTLY: ICD-10-CM

## 2023-08-10 PROCEDURE — 97112 NEUROMUSCULAR REEDUCATION: CPT | Mod: CQ

## 2023-08-10 PROCEDURE — 97110 THERAPEUTIC EXERCISES: CPT | Mod: CQ

## 2023-08-10 NOTE — PROGRESS NOTES
[facility]   Physical Therapy Treatment Note      Name: Coco Ramos  Clinic Number: [id number]     Therapy Diagnosis:        Encounter Diagnoses   Name Primary?    Unsteady gait Yes    Falls frequently      Seizure disorder          Physician: Holli Dean FNP     Physician Orders: PT Eval and Treat   Medical Diagnosis from Referral: R26.81 (ICD-10-CM) - Unsteady gait  R29.6 (ICD-10-CM) - Falls frequently  G40.909 (ICD-10-CM) - Seizure disorder  Evaluation Date: 7/24/2023  Authorization Period Expiration: 9/1/23  Plan of Care Expiration: 9/1/23  Progress Note Due: 10th visit  Visit # 5/11  PTA Visits: 4/5     Precautions: Fall, standard     Time In: 1014  Time Out: 1106  Total Appointment Time (timed & untimed codes):  52 minutes       Subjective     Pt reports: I haven't had anymore falls since I was here last. I can tell my walking is better.  Response to previous treatment: Good.   Functional change: None    Pain: 4/10  Location: bilateral hips       Objective      8/10/23  TUG Balance Test: 13 SECONDS    8/10/23  4 Stage Balance Test (time in seconds for each position)     Standing with feet: WNL  Instep standing: WNL  Tandem stand: 5 SECONDS  Single leg stand right: 4 SECONDS   left: 3 SECONDS    Treatment     Coco received the treatments listed below:      Therapeutic exercises to develop strength, ROM, and flexibility for 40 minutes including:  Scifit machine 6 minutes 1.8 resistance   Standing hip flexion, abduction, extension, mini squats and heel raises 2 x 10   Seated hip adduction with ball 2 x 10 3 s/h   Seated Hamstring curls red tband 2 x 10   Bilateral Supine Straight Leg raises 2 x 10 and hip abduction 1 x 10   Supine bridges 2 x 10     Neuromuscular re-education to develop balance, proprioception, and coordination for 12 minutes including:  Toe taps on 6 inch step 2 x 10  Step ups on foam pad 2 x 1 min   Tandem stance 2 x 15-20 sec each        Patient  "Education and Home Exercises       Education Provided: Patient was cued on using correct body mechanics and correct execution of exercises to facilitate maximal muscle contraction.     Written Home Exercises Provided: yes. Exercises were reviewed and Coco was able to demonstrate them prior to the end of the session. Coco demonstrated good understanding of the education provided. See EMR under Patient Instructions for exercises provided during therapy sessions.     Assessment     Coco required mild to moderate vc during exercises to complete and increased time to perform due to pt stopping and starting on a different one with pt stating, " My mind wanders a lot." Pt able to tolerate new exercises fairly well today and improved her 4 stage balance assessment score with bilateral SLS.     Coco Is progressing well towards her goals.     Pt prognosis is Good.     Pt will continue to benefit from skilled outpatient physical therapy to address the deficits listed in the problem list box on initial evaluation, provide pt/family education and to maximize pt's level of independence in the home and community environment.     Pt's spiritual, cultural and educational needs considered and pt agreeable to plan of care and goals.     Anticipated barriers to physical therapy: none    Goals:   Goals:  Short Term Goals: 3 weeks   Patient will demonstrate independent performance of home exercises program.  Patient will improve Timed Up and Go score to 12 seconds or less to reduce fall risks.  Patient will improve  4 Stage Balance Test Single leg stand to 5 seconds to reduce fall risks.     Long Term Goals: 5 weeks   Patient will increase bilateral hip muscle strength to 4/5 to improve balance and functional activity.  Patient will tolerate 35 minutes or greater of exercise/activity with 0/10 pain to improve endurance for ADL's.            Plan      Continue with current Plan of Care.     Melvi Carpenter, PTA        "

## 2023-08-15 ENCOUNTER — CLINICAL SUPPORT (OUTPATIENT)
Dept: REHABILITATION | Facility: HOSPITAL | Age: 68
End: 2023-08-15
Payer: MEDICARE

## 2023-08-15 DIAGNOSIS — R26.81 UNSTEADY GAIT: Primary | ICD-10-CM

## 2023-08-15 DIAGNOSIS — R29.6 FALLS FREQUENTLY: ICD-10-CM

## 2023-08-15 PROCEDURE — 97110 THERAPEUTIC EXERCISES: CPT | Mod: CQ

## 2023-08-15 NOTE — PROGRESS NOTES
[facility]   Physical Therapy Treatment Note      Name: Coco Ramos  Clinic Number: [id number]     Therapy Diagnosis: Unsteady gait       Encounter Diagnoses   Name Primary?    Unsteady gait Yes    Falls frequently      Seizure disorder          Physician: Holli Dean FNP     Physician Orders: PT Eval and Treat   Medical Diagnosis from Referral: R26.81 (ICD-10-CM) - Unsteady gait  R29.6 (ICD-10-CM) - Falls frequently  G40.909 (ICD-10-CM) - Seizure disorder  Evaluation Date: 7/24/2023  Authorization Period Expiration: 9/1/23  Plan of Care Expiration: 9/1/23  Progress Note Due: 10th visit  Visit # 6/11  PTA Visits: 5/5     Precautions: Fall, standard     Time In: 1017  Time Out: 1100  Total Appointment Time (timed & untimed codes): 43 minutes       Subjective     Pt reports: I was able to get out in the yard and burn some piles without falling or my hips hurting. I am getting better.    Response to previous treatment: Good.   Functional change: None.    Pain: 0/10  Location: bilateral hips       Objective      8/10/23  TUG Balance Test: 13 SECONDS    8/10/23  4 Stage Balance Test (time in seconds for each position)     Standing with feet: WNL  Instep standing: WNL  Tandem stand: 5 SECONDS  Single leg stand right: 4 SECONDS   left: 3 SECONDS    Treatment     Coco received the treatments listed below:      Therapeutic exercises to develop strength, ROM, and flexibility for 43 minutes including:  Scifit machine 5 minutes 2.3 resistance   Standing hip Straight leg raises, flexion, abduction, extension, mini squats and heel raises 2 x 10    Seated bilateral Long Arc Quad 1.5# 2 x 10   Seated Hamstring curls gr tband 2 x 10  Supine hip add with medium ball 2 x 10   Supine bilateral Straight Leg raises 2 x 10 and hip abduction 1 x 10   Supine bridges 2 x 12  Side lying clamshells red tband 2 x 10     NOT TODAY: Neuromuscular re-education to develop balance, proprioception, and  coordination for 0 minutes including:  Toe taps on 6 inch step 2 x 10  Step ups on foam pad 2 x 1 min   Tandem stance 2 x 15-20 sec each        Patient Education and Home Exercises       Education Provided: Patient was cued on using correct body mechanics and correct execution of exercises to facilitate maximal muscle contraction.     Written Home Exercises Provided: yes. Exercises were reviewed and Coco was able to demonstrate them prior to the end of the session. Coco demonstrated good understanding of the education provided. See EMR under Patient Instructions for exercises provided during therapy sessions.     Assessment     Today's PT was focused on strengthening and ROM for pt's Bilateral Lower Extremities with pt demo good technique mild vc on tasks being performed today. Pt also able to tolerate slight increase in resistance without difficulty.     Coco Is progressing well towards her goals.     Pt prognosis is Good.     Pt will continue to benefit from skilled outpatient physical therapy to address the deficits listed in the problem list box on initial evaluation, provide pt/family education and to maximize pt's level of independence in the home and community environment.     Pt's spiritual, cultural and educational needs considered and pt agreeable to plan of care and goals.     Anticipated barriers to physical therapy: none    Goals:   Goals:  Short Term Goals: 3 weeks   Patient will demonstrate independent performance of home exercises program. Goal Met.  Patient will improve Timed Up and Go score to 12 seconds or less to reduce fall risks.  Patient will improve  4 Stage Balance Test Single leg stand to 5 seconds to reduce fall risks.     Long Term Goals: 5 weeks   Patient will increase bilateral hip muscle strength to 4/5 to improve balance and functional activity.  Patient will tolerate 35 minutes or greater of exercise/activity with 0/10 pain to improve endurance for ADL's.            Plan       Continue with current Plan of Care.     Melvi Carpenter, PTA

## 2023-08-17 ENCOUNTER — CLINICAL SUPPORT (OUTPATIENT)
Dept: REHABILITATION | Facility: HOSPITAL | Age: 68
End: 2023-08-17
Payer: MEDICARE

## 2023-08-17 DIAGNOSIS — R29.6 FALLS FREQUENTLY: ICD-10-CM

## 2023-08-17 DIAGNOSIS — R26.81 UNSTEADY GAIT: Primary | ICD-10-CM

## 2023-08-17 PROCEDURE — 97110 THERAPEUTIC EXERCISES: CPT

## 2023-08-17 PROCEDURE — 97112 NEUROMUSCULAR REEDUCATION: CPT

## 2023-08-17 NOTE — PROGRESS NOTES
[facility]   Physical Therapy Treatment Note      Name: Coco Ramos  Clinic Number: [id number]     Therapy Diagnosis: Unsteady gait       Encounter Diagnoses   Name Primary?    Unsteady gait Yes    Falls frequently      Seizure disorder          Physician: Holli Dean FNP     Physician Orders: PT Eval and Treat   Medical Diagnosis from Referral: R26.81 (ICD-10-CM) - Unsteady gait  R29.6 (ICD-10-CM) - Falls frequently  G40.909 (ICD-10-CM) - Seizure disorder  Evaluation Date: 7/24/2023  Authorization Period Expiration: 9/1/23  Plan of Care Expiration: 9/1/23  Visit # 7/11  PTA Visits: 0/5     Precautions: Fall, standard     Time In: 1012  Time Out: 1153  Total Appointment Time (timed & untimed codes): 41 minutes       Subjective     Pt reports: no complaints.   Response to previous treatment: Good.   Functional change: None.    Pain: 0/10  Location: bilateral hips       Objective      8/10/23  TUG Balance Test: 13 SECONDS    8/10/23  4 Stage Balance Test (time in seconds for each position)     Standing with feet: WNL  Instep standing: WNL  Tandem stand: 5 SECONDS  Single leg stand right: 4 SECONDS   left: 3 SECONDS    Treatment     Coco received the treatments listed below:      Therapeutic exercises to develop strength, ROM, and flexibility for 26 minutes including:  Scifit machine 5 minutes 2.5 resistance   Standing hip Straight leg raises, flexion, abduction, extension, mini squats and heel raises 2 x 10    Seated bilateral Long Arc Quad 1.5# 3 x 10   Seated Hamstring curls gr tband 3 x 10  Supine hip add with medium ball 2 x 10  Supine bilateral Straight Leg raises 3 x 10 and hip abduction 2 x 10 1.5#  Supine bridges 3 x 10  Side lying clamshells red tband 2 x 10     Neuromuscular re-education to develop balance, proprioception, and coordination for 15 minutes including:  Toe taps on 6 inch step 2 x 1 minute  Marching on foam pad 2 x 2 minutes with Minimal assist  Tandem  stance right 1 minute and left 1 minute with Minimal assist       Patient Education and Home Exercises       Education Provided:   Coco received verbal and tactile cues to facilitate proper execution of exercises and body mechanics.     Written Home Exercises Provided: Cath instructed to continue with previous home program.     Assessment     Coco tolerated therapy well. Although her balance is improving, she still has difficulty with advanced balance activities which are challenging for her.    Coco Is progressing well towards her goals.     Pt prognosis is Good.     Pt will continue to benefit from skilled outpatient physical therapy to address the deficits listed in the problem list box on initial evaluation, provide pt/family education and to maximize pt's level of independence in the home and community environment.     Pt's spiritual, cultural and educational needs considered and pt agreeable to plan of care and goals.     Anticipated barriers to physical therapy: none    Goals:   Goals:  Short Term Goals: 3 weeks   Patient will demonstrate independent performance of home exercises program. Goal Met.  Patient will improve Timed Up and Go score to 12 seconds or less to reduce fall risks.  Patient will improve  4 Stage Balance Test Single leg stand to 5 seconds to reduce fall risks.     Long Term Goals: 5 weeks   Patient will increase bilateral hip muscle strength to 4/5 to improve balance and functional activity.  Patient will tolerate 35 minutes or greater of exercise/activity with 0/10 pain to improve endurance for ADL's.            Plan      Continue with current Plan of Care.     Melvi Carpenter, PTA

## 2023-08-22 ENCOUNTER — CLINICAL SUPPORT (OUTPATIENT)
Dept: REHABILITATION | Facility: HOSPITAL | Age: 68
End: 2023-08-22
Payer: MEDICARE

## 2023-08-22 DIAGNOSIS — R26.81 UNSTEADY GAIT: ICD-10-CM

## 2023-08-22 DIAGNOSIS — R29.6 FALLS FREQUENTLY: Primary | ICD-10-CM

## 2023-08-22 PROCEDURE — 97110 THERAPEUTIC EXERCISES: CPT

## 2023-08-22 NOTE — PROGRESS NOTES
[facility]   Physical Therapy Treatment Note      Name: Coco Ramos  Clinic Number: [id number]     Therapy Diagnosis: Unsteady gait       Encounter Diagnoses   Name Primary?    Unsteady gait Yes    Falls frequently      Seizure disorder          Physician: Holli Dean FNP     Physician Orders: PT Eval and Treat   Medical Diagnosis from Referral: R26.81 (ICD-10-CM) - Unsteady gait  R29.6 (ICD-10-CM) - Falls frequently  G40.909 (ICD-10-CM) - Seizure disorder  Evaluation Date: 7/24/2023  Authorization Period Expiration: 9/1/23  Plan of Care Expiration: 9/1/23  Visit # 8/11  PTA Visits: 0/5     Precautions: Fall, standard     Time In: 1009  Time Out: 1150  Total Appointment Time (timed & untimed codes): 41 minutes       Subjective     Pt reports: she fell last Sunday trying to make her bed. Has some bruising to right arm. She reports no pain today.  Response to previous treatment: Good.   Functional change: None.    Pain: 0/10  Location: bilateral hips       Objective      8/10/23  TUG Balance Test: 13 SECONDS    8/22/23  4 Stage Balance Test (time in seconds for each position)     Standing with feet: WNL  Instep standing: WNL  Tandem stand: 5 SECONDS  Single leg stand right: 5 SECONDS   left: 3 SECONDS    8/22/23  Manual muscle test   Muscle Right  Left    Hip flexion  MMT strength: 4/5  MMT strength: 4/5   Hip extension  MMT strength: 3+/5  MMT strength: 3+/5   Hip abduction  MMT strength: 3+/5  MMT strength: 3+/5   Hip adduction  MMT strength: 5/5  MMT strength: 5/5   Hip internal rotation  MMT strength: 4/5  MMT strength: 4/5   Hip external rotation  MMT strength: 4/5  MMT strength: 4/5   Knee extension  MMT strength: 5/5  MMT strength: 5/5   Knee flexion  MMT strength: 5/5  MMT strength: 5/5   Ankle DF  MMT strength: 4/5  MMT strength: 4/5   Ankle PF  MMT strength: 5/5  MMT strength: 5/5   Ankle inversion  MMT strength: 4/5  MMT strength: 4/5   Ankle eversion  MMT  strength: 4/5  MMT strength: 4/5        Treatment     Coco received the treatments listed below:      Therapeutic exercises to develop strength, ROM, and flexibility for 41 minutes including:  Scifit machine 5 minutes 3.0 resistance   Standing hip Straight leg raises, flexion, abduction, mini squats and heel raises 2 x 10  2#  Seated bilateral Long Arc Quad 2# 3 x 10   Seated Hamstring curls gr tband 2 x 15  Supine hip add with medium ball 2 x 10  Supine bilateral Straight Leg raises 3 x 10 and hip abduction 2 x 10 2#  Supine bridges 2 x 10 large ball      NOT TODAY-Neuromuscular re-education to develop balance, proprioception, and coordination for () minutes including:  Toe taps on 6 inch step 2 x 1 minute  Marching on foam pad 2 x 2 minutes with Minimal assist  Tandem stance right 1 minute and left 1 minute with Minimal assist       Patient Education and Home Exercises       Education Provided:   Coco received verbal and tactile cues to facilitate proper execution of exercises and body mechanics.     Written Home Exercises Provided: Cath instructed to continue with previous home program.     Assessment     Coco is responding well to therapy and demonstrated improved balance and strength per objective measurements.    Coco Is progressing well towards her goals.     Pt prognosis is Good.     Pt will continue to benefit from skilled outpatient physical therapy to address the deficits listed in the problem list box on initial evaluation, provide pt/family education and to maximize pt's level of independence in the home and community environment.     Pt's spiritual, cultural and educational needs considered and pt agreeable to plan of care and goals.     Anticipated barriers to physical therapy: none    Goals:   Goals:  Short Term Goals: 3 weeks   Patient will demonstrate independent performance of home exercises program. Goal Met.  Patient will improve Timed Up and Go score to 12 seconds or less to reduce fall  risks.  Patient will improve  4 Stage Balance Test Single leg stand to 5 seconds to reduce fall risks. Goal Met for right LE.     Long Term Goals: 5 weeks   Patient will increase bilateral hip muscle strength to 4/5 to improve balance and functional activity.  Patient will tolerate 35 minutes or greater of exercise/activity with 0/10 pain to improve endurance for ADL's. Goal Met.            Plan      Continue with current Plan of Care.     Smooth Marcus, PT

## 2023-08-24 ENCOUNTER — CLINICAL SUPPORT (OUTPATIENT)
Dept: REHABILITATION | Facility: HOSPITAL | Age: 68
End: 2023-08-24
Payer: MEDICARE

## 2023-08-24 DIAGNOSIS — R29.6 FALLS FREQUENTLY: ICD-10-CM

## 2023-08-24 DIAGNOSIS — R26.81 UNSTEADY GAIT: Primary | ICD-10-CM

## 2023-08-24 PROCEDURE — 97110 THERAPEUTIC EXERCISES: CPT | Mod: CQ

## 2023-08-24 NOTE — PROGRESS NOTES
[facility]   Physical Therapy Treatment Note      Name: Coco Ramos  Clinic Number: [id number]     Therapy Diagnosis: Unsteady gait       Encounter Diagnoses   Name Primary?    Unsteady gait Yes    Falls frequently      Seizure disorder          Physician: Holli Dean FNP     Physician Orders: PT Eval and Treat   Medical Diagnosis from Referral: R26.81 (ICD-10-CM) - Unsteady gait  R29.6 (ICD-10-CM) - Falls frequently  G40.909 (ICD-10-CM) - Seizure disorder  Evaluation Date: 7/24/2023  Authorization Period Expiration: 9/1/23  Plan of Care Expiration: 9/1/23    Visit # 9/11  PTA Visits: 1/5     Precautions: Fall, standard     Time In: 1007  Time Out: 1052  Total Appointment Time (timed & untimed codes): 45 minutes       Subjective     Pt reports: I feel good today. No pain anywhere.   Response to previous treatment: Good.   Functional change: None.    Pain: 0/10  Location: bilateral hips       Objective      8/10/23  TUG Balance Test: 13 SECONDS    8/22/23  4 Stage Balance Test (time in seconds for each position)     Standing with feet: WNL  Instep standing: WNL  Tandem stand: 5 SECONDS  Single leg stand right: 5 SECONDS   left: 3 SECONDS    8/22/23  Manual muscle test   Muscle Right  Left    Hip flexion  MMT strength: 4/5  MMT strength: 4/5   Hip extension  MMT strength: 3+/5  MMT strength: 3+/5   Hip abduction  MMT strength: 3+/5  MMT strength: 3+/5   Hip adduction  MMT strength: 5/5  MMT strength: 5/5   Hip internal rotation  MMT strength: 4/5  MMT strength: 4/5   Hip external rotation  MMT strength: 4/5  MMT strength: 4/5   Knee extension  MMT strength: 5/5  MMT strength: 5/5   Knee flexion  MMT strength: 5/5  MMT strength: 5/5   Ankle DF  MMT strength: 4/5  MMT strength: 4/5   Ankle PF  MMT strength: 5/5  MMT strength: 5/5   Ankle inversion  MMT strength: 4/5  MMT strength: 4/5   Ankle eversion  MMT strength: 4/5  MMT strength: 4/5        Treatment     Coco received the  treatments listed below:      Therapeutic exercises to develop strength, ROM, and flexibility for 45 minutes including:  Scifit machine 5 minutes 3.0 resistance -- not today  Forward step ups on 6 inch step 2 x 10   Standing hip flexion, abduction, mini squats, and heel raises 2 x 10 2#  Seated bilateral Long Arc Quad 2# 3 x 10   Seated Hamstring curls gr tband 2 x 15  Seated hip add with medium ball 2 x 15  Supine bilateral Straight Leg raises 3 x 10 and hip abduction 2 x 10 2#  Supine bridges 2 x 10 large ball  Prone hip extension 2 x 10       NOT TODAY- Neuromuscular re-education to develop balance, proprioception, and coordination for () minutes including:  Toe taps on 6 inch step 2 x 1 minute  Marching on foam pad 2 x 2 minutes with Minimal assist  Tandem stance right 1 minute and left 1 minute with Minimal assist       Patient Education and Home Exercises       Education Provided:   Coco received verbal and tactile cues to facilitate proper execution of exercises and body mechanics.     Written Home Exercises Provided: Cath instructed to continue with previous home program.     Assessment     Coco tolerated new standing and prone exercise fair with verbal and tactile cueing for proper technique without extreme difficulty. No LOB noted throughout session.     Coco Is progressing well towards her goals.     Pt prognosis is Good.     Pt will continue to benefit from skilled outpatient physical therapy to address the deficits listed in the problem list box on initial evaluation, provide pt/family education and to maximize pt's level of independence in the home and community environment.     Pt's spiritual, cultural and educational needs considered and pt agreeable to plan of care and goals.     Anticipated barriers to physical therapy: none    Goals:   Goals:  Short Term Goals: 3 weeks   Patient will demonstrate independent performance of home exercises program. Goal Met.  Patient will improve Timed Up and Go  score to 12 seconds or less to reduce fall risks.  Patient will improve  4 Stage Balance Test Single leg stand to 5 seconds to reduce fall risks. Goal Met for right LE.     Long Term Goals: 5 weeks   Patient will increase bilateral hip muscle strength to 4/5 to improve balance and functional activity.  Patient will tolerate 35 minutes or greater of exercise/activity with 0/10 pain to improve endurance for ADL's. Goal Met.            Plan      Continue with current Plan of Care.     Smooth Marcus, PT

## 2023-08-29 ENCOUNTER — CLINICAL SUPPORT (OUTPATIENT)
Dept: REHABILITATION | Facility: HOSPITAL | Age: 68
End: 2023-08-29
Payer: MEDICARE

## 2023-08-29 DIAGNOSIS — R29.6 FALLS FREQUENTLY: ICD-10-CM

## 2023-08-29 DIAGNOSIS — E78.2 MIXED HYPERLIPIDEMIA: ICD-10-CM

## 2023-08-29 DIAGNOSIS — R26.81 UNSTEADY GAIT: Primary | ICD-10-CM

## 2023-08-29 PROCEDURE — 97110 THERAPEUTIC EXERCISES: CPT

## 2023-08-29 PROCEDURE — 97112 NEUROMUSCULAR REEDUCATION: CPT

## 2023-08-29 NOTE — PROGRESS NOTES
[facility]APY AND [facility]   Physical Therapy Treatment Note      Name: Coco Ramos  Clinic Number: [id number]     Therapy Diagnosis: Unsteady gait       Encounter Diagnoses   Name Primary?    Unsteady gait Yes    Falls frequently      Seizure disorder          Physician: Holli Dean FNP     Physician Orders: PT Eval and Treat   Medical Diagnosis from Referral: R26.81 (ICD-10-CM) - Unsteady gait  R29.6 (ICD-10-CM) - Falls frequently  G40.909 (ICD-10-CM) - Seizure disorder  Evaluation Date: 7/24/2023  Authorization Period Expiration: 9/1/23  Plan of Care Expiration: 9/1/23    Visit # 9/11  PTA Visits: 1/5     Precautions: Fall, standard     Time In: 1020  Time Out: 1055  Total Appointment Time (timed & untimed codes): 35 minutes       Subjective     Pt reports: I had a fall at th store yesterday but I didn't hurt my self.  Response to previous treatment: Good.   Functional change: None.    Pain: 0/10  Location: bilateral hips       Objective      8/29/23  TUG Balance Test: 12 SECONDS    8/22/23  4 Stage Balance Test (time in seconds for each position)     Standing with feet: WNL  Instep standing: WNL  Tandem stand: 5 SECONDS  Single leg stand right: 5 SECONDS   left: 3 SECONDS    8/29/23  Manual muscle test   Muscle Right  Left    Hip flexion  MMT strength: 4/5  MMT strength: 4/5   Hip extension  MMT strength: 3+/5  MMT strength: 3+/5   Hip abduction  MMT strength: 4-/5  MMT strength: 4/5   Hip adduction  MMT strength: 5/5  MMT strength: 5/5   Hip internal rotation  MMT strength: 4/5  MMT strength: 4/5   Hip external rotation  MMT strength: 4/5  MMT strength: 4/5   Knee extension  MMT strength: 5/5  MMT strength: 5/5   Knee flexion  MMT strength: 5/5  MMT strength: 5/5   Ankle DF  MMT strength: 4/5  MMT strength: 4/5   Ankle PF  MMT strength: 5/5  MMT strength: 5/5   Ankle inversion  MMT strength: 4/5  MMT strength: 4/5   Ankle eversion  MMT strength: 4/5  MMT strength: 4/5        Treatment      Coco received the treatments listed below:      Therapeutic exercises to develop strength, ROM, and flexibility for 20 minutes including:  Scifit machine 5 minutes 3.0 resistance -- not today  Forward step ups on 6 inch step 2 x 10   Standing hip flexion, abduction, mini squats, and heel raises 2 x 10 2#  Seated bilateral Long Arc Quad 2# 3 x 10   Seated Hamstring curls gr tband 2 x 15  Seated hip add with medium ball 2 x 15  Supine bilateral Straight Leg raises 3 x 10 and hip abduction 2 x 10  Supine bridges 2 x 10 large ball  Prone hip extension 2 x 10       Neuromuscular re-education to develop balance, proprioception, and coordination for (15) minutes includin inch box step forward and lateral 2 x 10 each no hand support  TUG test trial 1: 17 seconds  TUG test trial 1: 16 seconds  TUG test trial 1: 12 seconds       Patient Education and Home Exercises       Education Provided:   Coco received verbal and tactile cues to facilitate proper execution of exercises and body mechanics.     Written Home Exercises Provided: Cath instructed to continue with previous home program.     Assessment     Coco is responding well to therapy and has improved on her LE strength and dynamic standing balance.    Coco Is progressing well towards her goals.     Pt prognosis is Good.     Pt will continue to benefit from skilled outpatient physical therapy to address the deficits listed in the problem list box on initial evaluation, provide pt/family education and to maximize pt's level of independence in the home and community environment.     Pt's spiritual, cultural and educational needs considered and pt agreeable to plan of care and goals.     Anticipated barriers to physical therapy: none    Goals:   Goals:  Short Term Goals: 3 weeks   Patient will demonstrate independent performance of home exercises program. Goal Met.  Patient will improve Timed Up and Go score to 12 seconds or less to reduce fall risks. Goal  Met.  Patient will improve  4 Stage Balance Test Single leg stand to 5 seconds to reduce fall risks. Goal Met for right LE.     Long Term Goals: 5 weeks   Patient will increase bilateral hip muscle strength to 4/5 to improve balance and functional activity.  Patient will tolerate 35 minutes or greater of exercise/activity with 0/10 pain to improve endurance for ADL's. Goal Met.            Plan      Continue with current Plan of Care.     Smooth Marcus, PT

## 2023-08-30 RX ORDER — PRAVASTATIN SODIUM 40 MG/1
40 TABLET ORAL NIGHTLY
Qty: 90 TABLET | Refills: 3 | Status: SHIPPED | OUTPATIENT
Start: 2023-08-30

## 2023-08-31 ENCOUNTER — CLINICAL SUPPORT (OUTPATIENT)
Dept: REHABILITATION | Facility: HOSPITAL | Age: 68
End: 2023-08-31
Payer: MEDICARE

## 2023-08-31 DIAGNOSIS — R29.6 FALLS FREQUENTLY: ICD-10-CM

## 2023-08-31 DIAGNOSIS — R26.81 UNSTEADY GAIT: Primary | ICD-10-CM

## 2023-08-31 PROCEDURE — 97110 THERAPEUTIC EXERCISES: CPT | Mod: CQ

## 2023-08-31 NOTE — PROGRESS NOTES
[facility]   Physical Therapy Treatment Note      Name: Coco Ramos  Clinic Number: [id number]     Therapy Diagnosis: Unsteady gait       Encounter Diagnoses   Name Primary?    Unsteady gait Yes    Falls frequently      Seizure disorder          Physician: Holli Dean FNP     Physician Orders: PT Eval and Treat   Medical Diagnosis from Referral: R26.81 (ICD-10-CM) - Unsteady gait  R29.6 (ICD-10-CM) - Falls frequently  G40.909 (ICD-10-CM) - Seizure disorder  Evaluation Date: 7/24/2023  Authorization Period Expiration: 9/1/23  Plan of Care Expiration: 9/1/23    Visit # 11/11  PTA Visits: 1/5     Precautions: Fall, standard     Time In: 1009  Time Out: 1050  Total Appointment Time (timed & untimed codes): 41 minutes       Subjective     Pt reports: I fell again unloading stuff out of my car I feel back onto the cart. I thought I broke something but I am okay.   Response to previous treatment: Good.   Functional change: None.    Pain: 7/10  Location: posterior right thoracic area     Objective      8/29/23  TUG Balance Test: 12 SECONDS    8/22/23  4 Stage Balance Test (time in seconds for each position)     Standing with feet: WNL  Instep standing: WNL  Tandem stand: 5 SECONDS  Single leg stand right: 5 SECONDS   left: 3 SECONDS    8/29/23  Manual muscle test   Muscle Right  Left    Hip flexion  MMT strength: 4/5  MMT strength: 4/5   Hip extension  MMT strength: 3+/5  MMT strength: 3+/5   Hip abduction  MMT strength: 4-/5  MMT strength: 4/5   Hip adduction  MMT strength: 5/5  MMT strength: 5/5   Hip internal rotation  MMT strength: 4/5  MMT strength: 4/5   Hip external rotation  MMT strength: 4/5  MMT strength: 4/5   Knee extension  MMT strength: 5/5  MMT strength: 5/5   Knee flexion  MMT strength: 5/5  MMT strength: 5/5   Ankle DF  MMT strength: 4/5  MMT strength: 4/5   Ankle PF  MMT strength: 5/5  MMT strength: 5/5   Ankle inversion  MMT strength: 4/5  MMT strength: 4/5    Ankle eversion  MMT strength: 4/5  MMT strength: 4/5        Treatment     Coco received the treatments listed below:      Therapeutic exercises to develop strength, ROM, and flexibility for 41 minutes including:  Scifit machine 5 minutes 3.0 resistance  Forward step ups on 6 inch step 2 x 10   Standing hip flexion, left hip abduction, and hip extension 2 x 10 2#  Standing mini squats and heel raises 2 x 10   Seated bilateral Long Arc Quad 2# 3 x 10   Seated Hamstring curls gr tband 2 x 15  Seated hip add with medium ball 2 x 20  Supine bilateral Straight Leg raises 2 x 10 and side lying right hip abduction 2 x 10  Supine bridges 2 x 10 large ball  Prone hip extension 2 x 10     Patient Education and Home Exercises       Education Provided:   Coco received verbal and tactile cues to facilitate proper execution of exercises and body mechanics.     Written Home Exercises Provided: Meagan instructed to continue with previous home program.     Assessment     Coco able to tolerate all exercises listed above fair to well on her last approved PT session today with increased right back pan during hip abduction in standing and side lying on therapy mat so held off. Pt wants to go back and see her doctor to get more PT visits. Will follow up.     Coco Is progressing well towards her goals.     Pt prognosis is Good.     Pt will continue to benefit from skilled outpatient physical therapy to address the deficits listed in the problem list box on initial evaluation, provide pt/family education and to maximize pt's level of independence in the home and community environment.     Pt's spiritual, cultural and educational needs considered and pt agreeable to plan of care and goals.     Anticipated barriers to physical therapy: none    Goals:   Goals:  Short Term Goals: 3 weeks   Patient will demonstrate independent performance of home exercises program. Goal Met.  Patient will improve Timed Up and Go score to 12 seconds or less  to reduce fall risks. Goal Met.  Patient will improve  4 Stage Balance Test Single leg stand to 5 seconds to reduce fall risks. Goal Met for right LE.     Long Term Goals: 5 weeks   Patient will increase bilateral hip muscle strength to 4/5 to improve balance and functional activity. Partially Met.  Patient will tolerate 35 minutes or greater of exercise/activity with 0/10 pain to improve endurance for ADL's. Goal Met.            Plan      Continue with current Plan of Care.     Melvi Carpenter, PTA

## 2023-10-12 DIAGNOSIS — I10 HYPERTENSION, UNSPECIFIED TYPE: ICD-10-CM

## 2023-10-12 RX ORDER — LISINOPRIL 20 MG/1
20 TABLET ORAL DAILY
Qty: 90 TABLET | Refills: 0 | Status: SHIPPED | OUTPATIENT
Start: 2023-10-12

## 2023-12-09 DIAGNOSIS — Z71.89 COMPLEX CARE COORDINATION: ICD-10-CM

## 2024-04-11 ENCOUNTER — OFFICE VISIT (OUTPATIENT)
Dept: FAMILY MEDICINE | Facility: CLINIC | Age: 69
End: 2024-04-11
Payer: MEDICARE

## 2024-04-11 VITALS
BODY MASS INDEX: 25.8 KG/M2 | TEMPERATURE: 98 F | HEART RATE: 72 BPM | OXYGEN SATURATION: 95 % | WEIGHT: 128 LBS | SYSTOLIC BLOOD PRESSURE: 178 MMHG | HEIGHT: 59 IN | DIASTOLIC BLOOD PRESSURE: 74 MMHG | RESPIRATION RATE: 18 BRPM

## 2024-04-11 DIAGNOSIS — E78.2 MIXED HYPERLIPIDEMIA: ICD-10-CM

## 2024-04-11 DIAGNOSIS — I10 HYPERTENSION, UNSPECIFIED TYPE: Primary | Chronic | ICD-10-CM

## 2024-04-11 DIAGNOSIS — G40.909 SEIZURE DISORDER: Chronic | ICD-10-CM

## 2024-04-11 DIAGNOSIS — E03.9 HYPOTHYROIDISM, UNSPECIFIED TYPE: ICD-10-CM

## 2024-04-11 LAB
ALBUMIN SERPL BCP-MCNC: 4.4 G/DL (ref 3.5–5)
ALBUMIN/GLOB SERPL: 1.4 {RATIO}
ALP SERPL-CCNC: 85 U/L (ref 55–142)
ALT SERPL W P-5'-P-CCNC: 31 U/L (ref 13–56)
ANION GAP SERPL CALCULATED.3IONS-SCNC: 12 MMOL/L (ref 7–16)
AST SERPL W P-5'-P-CCNC: 22 U/L (ref 15–37)
BASOPHILS # BLD AUTO: 0.06 K/UL (ref 0–0.2)
BASOPHILS NFR BLD AUTO: 0.6 % (ref 0–1)
BILIRUB SERPL-MCNC: 0.4 MG/DL (ref ?–1.2)
BUN SERPL-MCNC: 18 MG/DL (ref 7–18)
BUN/CREAT SERPL: 20 (ref 6–20)
CALCIUM SERPL-MCNC: 9.4 MG/DL (ref 8.5–10.1)
CHLORIDE SERPL-SCNC: 106 MMOL/L (ref 98–107)
CHOLEST SERPL-MCNC: 187 MG/DL (ref 0–200)
CHOLEST/HDLC SERPL: 2.4 {RATIO}
CO2 SERPL-SCNC: 27 MMOL/L (ref 21–32)
CREAT SERPL-MCNC: 0.9 MG/DL (ref 0.55–1.02)
DIFFERENTIAL METHOD BLD: ABNORMAL
EGFR (NO RACE VARIABLE) (RUSH/TITUS): 71 ML/MIN/1.73M2
EOSINOPHIL # BLD AUTO: 0.14 K/UL (ref 0–0.5)
EOSINOPHIL NFR BLD AUTO: 1.5 % (ref 1–4)
ERYTHROCYTE [DISTWIDTH] IN BLOOD BY AUTOMATED COUNT: 12.7 % (ref 11.5–14.5)
GLOBULIN SER-MCNC: 3.1 G/DL (ref 2–4)
GLUCOSE SERPL-MCNC: 98 MG/DL (ref 74–106)
HCT VFR BLD AUTO: 43.6 % (ref 38–47)
HDLC SERPL-MCNC: 78 MG/DL (ref 40–60)
HGB BLD-MCNC: 14.2 G/DL (ref 12–16)
IMM GRANULOCYTES # BLD AUTO: 0.03 K/UL (ref 0–0.04)
IMM GRANULOCYTES NFR BLD: 0.3 % (ref 0–0.4)
LDLC SERPL CALC-MCNC: 94 MG/DL
LDLC/HDLC SERPL: 1.2 {RATIO}
LYMPHOCYTES # BLD AUTO: 1.59 K/UL (ref 1–4.8)
LYMPHOCYTES NFR BLD AUTO: 16.6 % (ref 27–41)
MCH RBC QN AUTO: 31.8 PG (ref 27–31)
MCHC RBC AUTO-ENTMCNC: 32.6 G/DL (ref 32–36)
MCV RBC AUTO: 97.5 FL (ref 80–96)
MONOCYTES # BLD AUTO: 0.68 K/UL (ref 0–0.8)
MONOCYTES NFR BLD AUTO: 7.1 % (ref 2–6)
MPC BLD CALC-MCNC: 11.4 FL (ref 9.4–12.4)
NEUTROPHILS # BLD AUTO: 7.06 K/UL (ref 1.8–7.7)
NEUTROPHILS NFR BLD AUTO: 73.9 % (ref 53–65)
NONHDLC SERPL-MCNC: 109 MG/DL
NRBC # BLD AUTO: 0 X10E3/UL
NRBC, AUTO (.00): 0 %
PLATELET # BLD AUTO: 215 K/UL (ref 150–400)
POTASSIUM SERPL-SCNC: 4.1 MMOL/L (ref 3.5–5.1)
PROT SERPL-MCNC: 7.5 G/DL (ref 6.4–8.2)
RBC # BLD AUTO: 4.47 M/UL (ref 4.2–5.4)
SODIUM SERPL-SCNC: 141 MMOL/L (ref 136–145)
TRIGL SERPL-MCNC: 77 MG/DL (ref 35–150)
TSH SERPL DL<=0.005 MIU/L-ACNC: 1.76 UIU/ML (ref 0.36–3.74)
VLDLC SERPL-MCNC: 15 MG/DL
WBC # BLD AUTO: 9.56 K/UL (ref 4.5–11)

## 2024-04-11 PROCEDURE — 84443 ASSAY THYROID STIM HORMONE: CPT | Mod: ,,, | Performed by: CLINICAL MEDICAL LABORATORY

## 2024-04-11 PROCEDURE — 80061 LIPID PANEL: CPT | Mod: ,,, | Performed by: CLINICAL MEDICAL LABORATORY

## 2024-04-11 PROCEDURE — 80053 COMPREHEN METABOLIC PANEL: CPT | Mod: ,,, | Performed by: CLINICAL MEDICAL LABORATORY

## 2024-04-11 PROCEDURE — 99214 OFFICE O/P EST MOD 30 MIN: CPT | Mod: ,,, | Performed by: NURSE PRACTITIONER

## 2024-04-11 PROCEDURE — 85025 COMPLETE CBC W/AUTO DIFF WBC: CPT | Mod: ,,, | Performed by: CLINICAL MEDICAL LABORATORY

## 2024-04-11 RX ORDER — PRAVASTATIN SODIUM 40 MG/1
40 TABLET ORAL NIGHTLY
Qty: 90 TABLET | Refills: 3 | Status: SHIPPED | OUTPATIENT
Start: 2024-04-11

## 2024-04-11 RX ORDER — LISINOPRIL 30 MG/1
30 TABLET ORAL DAILY
Qty: 30 TABLET | Refills: 1 | Status: SHIPPED | OUTPATIENT
Start: 2024-04-11 | End: 2024-05-09 | Stop reason: SDUPTHER

## 2024-04-11 RX ORDER — LISINOPRIL 20 MG/1
20 TABLET ORAL DAILY
Qty: 90 TABLET | Refills: 1 | Status: CANCELLED | OUTPATIENT
Start: 2024-04-11

## 2024-04-11 NOTE — PROGRESS NOTES
Clinic Note    Coco Ramos is a 68 y.o. female     Chief Complaint:   Chief Complaint   Patient presents with    Medication Refill     Unable to verify all meds at this time.only brought lisinopril 20mg med bottle to appt.     Fall     States fell night after eclipse. Right elbow skin tear/bruise and right hip.   Also concerned with unsteady gait.         Subjective:    Patient comes in today for chronic disease follow up and medication refills  Pmh-seizure disorder, htn, hyperlipidemia, hypothyroidism  Patient only brings 1 pill bottle of lisinopril. Has wrist bp cuff with her today. Reports bp elevated at home ranging 140-170s systolic/ 80s. Admits to med compliance. Admits to occasional headaches but related that to seizure disorder.  Patient states other medications are managed by her neurologist Dr. Kalee De Los Santos. States she does telemed visits routinely. Reports seizures are controlled.   Otherwise patient denies any complaints or concerns.         Allergies:   Review of patient's allergies indicates:  No Known Allergies     Past Medical History:  Past Medical History:   Diagnosis Date    Anxiety     Hypertension     Hyperthyroidism     Seizures         Current Medications:    Current Outpatient Medications:     amitriptyline (ELAVIL) 50 MG tablet, Take 50 mg by mouth every evening., Disp: , Rfl:     DULoxetine (CYMBALTA) 60 MG capsule, Take 60 mg by mouth 2 (two) times a day., Disp: , Rfl:     gabapentin (NEURONTIN) 300 MG capsule, Take 300 mg by mouth once daily., Disp: , Rfl:     hydrOXYzine HCL (ATARAX) 25 MG tablet, Take 25 mg by mouth 4 (four) times daily., Disp: , Rfl:     lamoTRIgine (LAMICTAL) 150 MG Tab, Take 25 mg by mouth 2 (two) times a day., Disp: , Rfl:     levothyroxine (SYNTHROID) 75 MCG tablet, Take 75 mcg by mouth before breakfast., Disp: , Rfl:     lisinopriL (PRINIVIL,ZESTRIL) 30 MG tablet, Take 1 tablet (30 mg total) by mouth once daily., Disp: 30 tablet, Rfl: 1    magnesium  "oxide (MAG-OX) 400 mg (241.3 mg magnesium) tablet, Take 1 tablet by mouth once daily., Disp: , Rfl:     naproxen sodium (ANAPROX) 220 MG tablet, Take 220 mg by mouth every 12 (twelve) hours as needed., Disp: , Rfl:     pravastatin (PRAVACHOL) 40 MG tablet, Take 1 tablet (40 mg total) by mouth every evening., Disp: 90 tablet, Rfl: 3       Review of Systems   Constitutional:  Negative for fever.   Respiratory:  Negative for cough and shortness of breath.    Cardiovascular:  Negative for chest pain, palpitations and leg swelling.   Gastrointestinal:  Positive for constipation. Negative for abdominal pain, diarrhea, nausea and vomiting.   Genitourinary:  Negative for dysuria.   Neurological:  Positive for headaches. Negative for seizures.          Objective:    BP (!) 178/74 (BP Location: Left arm, Patient Position: Sitting, BP Method: Small (Manual))   Pulse 72   Temp 98.1 °F (36.7 °C) (Oral)   Resp 18   Ht 4' 11" (1.499 m)   Wt 58.1 kg (128 lb)   SpO2 95%   BMI 25.85 kg/m²      Physical Exam  Constitutional:       Appearance: Normal appearance.   Eyes:      Extraocular Movements: Extraocular movements intact.   Cardiovascular:      Rate and Rhythm: Normal rate and regular rhythm.      Pulses: Normal pulses.      Heart sounds: Normal heart sounds.   Pulmonary:      Effort: Pulmonary effort is normal.      Breath sounds: Normal breath sounds.   Abdominal:      Palpations: Abdomen is soft.      Tenderness: There is no abdominal tenderness. There is no guarding or rebound.   Musculoskeletal:      Right lower leg: No edema.      Left lower leg: No edema.   Skin:     General: Skin is warm and dry.   Neurological:      Mental Status: She is alert and oriented to person, place, and time. Mental status is at baseline.   Psychiatric:         Mood and Affect: Mood normal.          Assessment and Plan:    1. Hypertension, unspecified type    2. Mixed hyperlipidemia    3. Seizure disorder    4. Hypothyroidism, unspecified " type         Hypertension, unspecified type  -     CBC Auto Differential; Future; Expected date: 04/11/2024  -     Comprehensive Metabolic Panel; Future; Expected date: 04/11/2024  -     lisinopriL (PRINIVIL,ZESTRIL) 30 MG tablet; Take 1 tablet (30 mg total) by mouth once daily.  Dispense: 30 tablet; Refill: 1  -increased lisinopril to 30mg vs 20mg. F/u in 4 weeks with bp log  -low salt diet and exercise    Mixed hyperlipidemia  -     pravastatin (PRAVACHOL) 40 MG tablet; Take 1 tablet (40 mg total) by mouth every evening.  Dispense: 90 tablet; Refill: 3  -     Lipid Panel; Future; Expected date: 04/11/2024  -low cholesterol diet and exercise    Seizure disorder  -continue current tx  -f/u with neurologist as scheduled    Hypothyroidism, unspecified type  -     TSH; Future; Expected date: 04/11/2024      -reviewed all care gaps. Declines all.      There are no Patient Instructions on file for this visit.   Follow up in about 4 weeks (around 5/9/2024).

## 2024-04-12 ENCOUNTER — TELEPHONE (OUTPATIENT)
Dept: FAMILY MEDICINE | Facility: CLINIC | Age: 69
End: 2024-04-12
Payer: MEDICARE

## 2024-04-12 NOTE — TELEPHONE ENCOUNTER
Contacted pt in regards to lab results. Informed pt that her lab results were stable and requires no change in her current treatment. Pt voiced understanding and had no further questions.     ----- Message from KIA Kessler sent at 4/12/2024  8:29 AM CDT -----  Labs stable

## 2024-05-09 ENCOUNTER — OFFICE VISIT (OUTPATIENT)
Dept: FAMILY MEDICINE | Facility: CLINIC | Age: 69
End: 2024-05-09
Payer: MEDICARE

## 2024-05-09 VITALS
BODY MASS INDEX: 26 KG/M2 | OXYGEN SATURATION: 95 % | TEMPERATURE: 98 F | WEIGHT: 129 LBS | DIASTOLIC BLOOD PRESSURE: 70 MMHG | HEART RATE: 75 BPM | RESPIRATION RATE: 20 BRPM | SYSTOLIC BLOOD PRESSURE: 138 MMHG | HEIGHT: 59 IN

## 2024-05-09 DIAGNOSIS — I10 PRIMARY HYPERTENSION: Primary | Chronic | ICD-10-CM

## 2024-05-09 PROCEDURE — 99212 OFFICE O/P EST SF 10 MIN: CPT | Mod: ,,, | Performed by: NURSE PRACTITIONER

## 2024-05-09 RX ORDER — LISINOPRIL 30 MG/1
30 TABLET ORAL DAILY
Qty: 90 TABLET | Refills: 1 | Status: SHIPPED | OUTPATIENT
Start: 2024-05-09 | End: 2025-05-09

## 2024-05-09 NOTE — PROGRESS NOTES
Clinic Note    Coco Ramos is a 68 y.o. female     Chief Complaint:   Chief Complaint   Patient presents with    Hypertension     Four weeks follow up    Health Maintenance     Hepatitis C Screening Never done  TETANUS VACCINE Never done  Colorectal Cancer Screening Never done  Shingles Vaccine(1 of 2) Never done  RSV Vaccine (Age 60+ and Pregnant patients)(1 - 1-dose 60+ series) Never done  Mammogram due on 10/18/2019  Pneumococcal Vaccines (Age 65+)(1 of 1 - PCV) Never done  DEXA Scan due on 10/24/2022  COVID-19 Vaccine(3 - 2023-24 season) due on 09/01/2023  Hemoglobin A1c (Diabetic Prevention Screening) due on 03/04/2024         Subjective:    Patient comes in today for 4 week follow up. Patient admits to med compliance with increasing lisinopril to 30mg daily. Reports headaches improved. Brings wrist cuff with her today to review bp log. Denies any complaints or concerns.     Hypertension  Pertinent negatives include no chest pain, headaches or shortness of breath.        Allergies:   Review of patient's allergies indicates:  No Known Allergies     Past Medical History:  Past Medical History:   Diagnosis Date    Anxiety     Hypertension     Hyperthyroidism     Seizures         Current Medications:    Current Outpatient Medications:     amitriptyline (ELAVIL) 50 MG tablet, Take 50 mg by mouth every evening., Disp: , Rfl:     DULoxetine (CYMBALTA) 60 MG capsule, Take 60 mg by mouth 2 (two) times a day., Disp: , Rfl:     gabapentin (NEURONTIN) 300 MG capsule, Take 300 mg by mouth once daily., Disp: , Rfl:     hydrOXYzine HCL (ATARAX) 25 MG tablet, Take 25 mg by mouth 4 (four) times daily., Disp: , Rfl:     lamoTRIgine (LAMICTAL) 150 MG Tab, Take 25 mg by mouth 2 (two) times a day., Disp: , Rfl:     levothyroxine (SYNTHROID) 75 MCG tablet, Take 75 mcg by mouth before breakfast., Disp: , Rfl:     lisinopriL (PRINIVIL,ZESTRIL) 30 MG tablet, Take 1 tablet (30 mg total) by mouth once daily., Disp: 90 tablet,  "Rfl: 1    magnesium oxide (MAG-OX) 400 mg (241.3 mg magnesium) tablet, Take 1 tablet by mouth once daily., Disp: , Rfl:     naproxen sodium (ANAPROX) 220 MG tablet, Take 220 mg by mouth every 12 (twelve) hours as needed., Disp: , Rfl:     pravastatin (PRAVACHOL) 40 MG tablet, Take 1 tablet (40 mg total) by mouth every evening., Disp: 90 tablet, Rfl: 3       Review of Systems   Constitutional:  Negative for fever.   Respiratory:  Negative for cough and shortness of breath.    Cardiovascular:  Negative for chest pain.   Gastrointestinal:  Negative for abdominal pain.   Neurological:  Negative for headaches.          Objective:    BP (!) 140/70 (BP Location: Left arm, Patient Position: Sitting, BP Method: Medium (Manual))   Pulse 75   Temp 98 °F (36.7 °C) (Oral)   Resp 20   Ht 4' 11" (1.499 m)   Wt 58.5 kg (129 lb)   SpO2 95%   BMI 26.05 kg/m²      Physical Exam  Constitutional:       Appearance: Normal appearance.   Eyes:      Extraocular Movements: Extraocular movements intact.   Cardiovascular:      Rate and Rhythm: Normal rate and regular rhythm.      Pulses: Normal pulses.      Heart sounds: Normal heart sounds.   Pulmonary:      Effort: Pulmonary effort is normal.      Breath sounds: Normal breath sounds.   Musculoskeletal:      Right lower leg: No edema.      Left lower leg: No edema.   Neurological:      Mental Status: She is alert and oriented to person, place, and time.          Assessment and Plan:    1. Primary hypertension  Assessment & Plan:  -bp stable on lisinopril 30mg daily  -low salt diet and exercise  -continue to monitor bp at home. Return sooner if persistently elevated bp    Orders:  -     lisinopriL (PRINIVIL,ZESTRIL) 30 MG tablet; Take 1 tablet (30 mg total) by mouth once daily.  Dispense: 90 tablet; Refill: 1    -patient took bp with wrist cuff while in room. Cuff read 190/80. Manual bp per nurse 138/70. Encouraged patient to monitor with arm cuff when possible          There are no " Patient Instructions on file for this visit.   Follow up in about 5 months (around 10/9/2024).

## 2024-05-09 NOTE — ASSESSMENT & PLAN NOTE
-bp stable on lisinopril 30mg daily  -low salt diet and exercise  -continue to monitor bp at home. Return sooner if persistently elevated bp

## 2024-07-09 DIAGNOSIS — Z71.89 COMPLEX CARE COORDINATION: ICD-10-CM

## 2024-10-12 DIAGNOSIS — I10 PRIMARY HYPERTENSION: Chronic | ICD-10-CM

## 2024-10-14 RX ORDER — LISINOPRIL 30 MG/1
30 TABLET ORAL
Qty: 90 TABLET | Refills: 1 | Status: SHIPPED | OUTPATIENT
Start: 2024-10-14

## 2024-10-15 ENCOUNTER — PATIENT OUTREACH (OUTPATIENT)
Facility: HOSPITAL | Age: 69
End: 2024-10-15
Payer: MEDICARE

## 2024-10-15 NOTE — PROGRESS NOTES
Population Health Chart Review & Patient Outreach Details    Updates Requested / Reviewed:    [x]  Care Everywhere Updated & Reviewed  [x]   Reviewed      Health Maintenance Topics Addressed and Outreach Outcomes / Actions Taken:  Breast Cancer Screening [x] No documentation found in Care Everywhere for mammogram.      [x] Patient has an AWV appt scheduled on 10/17/24. Comment placed in appt note and chart that pt needs this.

## 2024-10-17 ENCOUNTER — OFFICE VISIT (OUTPATIENT)
Dept: FAMILY MEDICINE | Facility: CLINIC | Age: 69
End: 2024-10-17
Payer: MEDICARE

## 2024-10-17 VITALS
BODY MASS INDEX: 26.41 KG/M2 | DIASTOLIC BLOOD PRESSURE: 74 MMHG | HEIGHT: 59 IN | RESPIRATION RATE: 14 BRPM | SYSTOLIC BLOOD PRESSURE: 140 MMHG | TEMPERATURE: 99 F | HEART RATE: 78 BPM | WEIGHT: 131 LBS | OXYGEN SATURATION: 98 %

## 2024-10-17 DIAGNOSIS — E03.9 HYPOTHYROIDISM, UNSPECIFIED TYPE: ICD-10-CM

## 2024-10-17 DIAGNOSIS — Z79.899 ENCOUNTER FOR LONG-TERM CURRENT USE OF MEDICATION: ICD-10-CM

## 2024-10-17 DIAGNOSIS — Z00.00 ENCOUNTER FOR INITIAL ANNUAL WELLNESS VISIT (AWV) IN MEDICARE PATIENT: Primary | ICD-10-CM

## 2024-10-17 DIAGNOSIS — I10 PRIMARY HYPERTENSION: ICD-10-CM

## 2024-10-17 DIAGNOSIS — E78.2 MIXED HYPERLIPIDEMIA: ICD-10-CM

## 2024-10-17 DIAGNOSIS — Z00.00 ENCOUNTER FOR PREVENTIVE HEALTH EXAMINATION: ICD-10-CM

## 2024-10-17 LAB
ALBUMIN SERPL BCP-MCNC: 4.1 G/DL (ref 3.5–5)
ALBUMIN/GLOB SERPL: 1.4 {RATIO}
ALP SERPL-CCNC: 77 U/L (ref 55–142)
ALT SERPL W P-5'-P-CCNC: 30 U/L (ref 13–56)
ANION GAP SERPL CALCULATED.3IONS-SCNC: 9 MMOL/L (ref 7–16)
AST SERPL W P-5'-P-CCNC: 17 U/L (ref 15–37)
BASOPHILS # BLD AUTO: 0.04 K/UL (ref 0–0.2)
BASOPHILS NFR BLD AUTO: 0.5 % (ref 0–1)
BILIRUB SERPL-MCNC: 0.4 MG/DL (ref ?–1.2)
BUN SERPL-MCNC: 16 MG/DL (ref 7–18)
BUN/CREAT SERPL: 20 (ref 6–20)
CALCIUM SERPL-MCNC: 8.9 MG/DL (ref 8.5–10.1)
CHLORIDE SERPL-SCNC: 108 MMOL/L (ref 98–107)
CHOLEST SERPL-MCNC: 178 MG/DL (ref 0–200)
CHOLEST/HDLC SERPL: 2.6 {RATIO}
CO2 SERPL-SCNC: 30 MMOL/L (ref 21–32)
CREAT SERPL-MCNC: 0.82 MG/DL (ref 0.55–1.02)
DIFFERENTIAL METHOD BLD: ABNORMAL
EGFR (NO RACE VARIABLE) (RUSH/TITUS): 79 ML/MIN/1.73M2
EOSINOPHIL # BLD AUTO: 0.19 K/UL (ref 0–0.5)
EOSINOPHIL NFR BLD AUTO: 2.2 % (ref 1–4)
ERYTHROCYTE [DISTWIDTH] IN BLOOD BY AUTOMATED COUNT: 13.1 % (ref 11.5–14.5)
EST. AVERAGE GLUCOSE BLD GHB EST-MCNC: 111 MG/DL
GLOBULIN SER-MCNC: 3 G/DL (ref 2–4)
GLUCOSE SERPL-MCNC: 95 MG/DL (ref 74–106)
HBA1C MFR BLD HPLC: 5.5 % (ref 4.5–6.6)
HCT VFR BLD AUTO: 43.5 % (ref 38–47)
HDLC SERPL-MCNC: 69 MG/DL (ref 40–60)
HGB BLD-MCNC: 14 G/DL (ref 12–16)
IMM GRANULOCYTES # BLD AUTO: 0.05 K/UL (ref 0–0.04)
IMM GRANULOCYTES NFR BLD: 0.6 % (ref 0–0.4)
LDLC SERPL CALC-MCNC: 78 MG/DL
LDLC/HDLC SERPL: 1.1 {RATIO}
LYMPHOCYTES # BLD AUTO: 1.46 K/UL (ref 1–4.8)
LYMPHOCYTES NFR BLD AUTO: 17 % (ref 27–41)
MCH RBC QN AUTO: 31.4 PG (ref 27–31)
MCHC RBC AUTO-ENTMCNC: 32.2 G/DL (ref 32–36)
MCV RBC AUTO: 97.5 FL (ref 80–96)
MONOCYTES # BLD AUTO: 0.64 K/UL (ref 0–0.8)
MONOCYTES NFR BLD AUTO: 7.4 % (ref 2–6)
MPC BLD CALC-MCNC: 11.3 FL (ref 9.4–12.4)
NEUTROPHILS # BLD AUTO: 6.23 K/UL (ref 1.8–7.7)
NEUTROPHILS NFR BLD AUTO: 72.3 % (ref 53–65)
NONHDLC SERPL-MCNC: 109 MG/DL
NRBC # BLD AUTO: 0 X10E3/UL
NRBC, AUTO (.00): 0 %
PLATELET # BLD AUTO: 213 K/UL (ref 150–400)
POTASSIUM SERPL-SCNC: 3.9 MMOL/L (ref 3.5–5.1)
PROT SERPL-MCNC: 7.1 G/DL (ref 6.4–8.2)
RBC # BLD AUTO: 4.46 M/UL (ref 4.2–5.4)
SODIUM SERPL-SCNC: 143 MMOL/L (ref 136–145)
TRIGL SERPL-MCNC: 156 MG/DL (ref 35–150)
TSH SERPL DL<=0.005 MIU/L-ACNC: 0.29 UIU/ML (ref 0.36–3.74)
VLDLC SERPL-MCNC: 31 MG/DL
WBC # BLD AUTO: 8.61 K/UL (ref 4.5–11)

## 2024-10-17 PROCEDURE — 83036 HEMOGLOBIN GLYCOSYLATED A1C: CPT | Mod: ,,, | Performed by: CLINICAL MEDICAL LABORATORY

## 2024-10-17 PROCEDURE — 84443 ASSAY THYROID STIM HORMONE: CPT | Mod: ,,, | Performed by: CLINICAL MEDICAL LABORATORY

## 2024-10-17 PROCEDURE — G0438 PPPS, INITIAL VISIT: HCPCS | Mod: ,,, | Performed by: NURSE PRACTITIONER

## 2024-10-17 PROCEDURE — 80061 LIPID PANEL: CPT | Mod: ,,, | Performed by: CLINICAL MEDICAL LABORATORY

## 2024-10-17 PROCEDURE — 80053 COMPREHEN METABOLIC PANEL: CPT | Mod: ,,, | Performed by: CLINICAL MEDICAL LABORATORY

## 2024-10-17 PROCEDURE — 85025 COMPLETE CBC W/AUTO DIFF WBC: CPT | Mod: ,,, | Performed by: CLINICAL MEDICAL LABORATORY

## 2024-10-17 RX ORDER — LAMOTRIGINE 25 MG/1
2 TABLET ORAL
COMMUNITY
Start: 2024-04-25

## 2024-10-17 RX ORDER — NAPROXEN SODIUM 220 MG
1 TABLET ORAL
COMMUNITY
End: 2024-10-17

## 2024-10-17 RX ORDER — LEVOTHYROXINE SODIUM 75 UG/1
1 TABLET ORAL DAILY
COMMUNITY
Start: 2024-05-21

## 2024-10-17 NOTE — PROGRESS NOTES
"  Coco Ramos presented for a  Medicare AWV and comprehensive Health Risk Assessment today. The following components were reviewed and updated:    Medical history  Family History  Social history  Allergies and Current Medications  Health Risk Assessment  Health Maintenance  Care Team         ** See Completed Assessments for Annual Wellness Visit within the encounter summary.**         The following assessments were completed:  Living Situation  CAGE  Depression Screening  Timed Get Up and Go  Whisper Test  Cognitive Function Screening  Nutrition Screening  ADL Screening  PAQ Screening      Opioid documentation:      Patient does not have a current opioid prescription.   Does not     Vitals:    10/17/24 0946 10/17/24 1004   BP: (!) 150/78 (!) 140/74   BP Location: Right arm Right arm   Patient Position: Sitting Sitting   Pulse: 78    Resp: 14    Temp: 98.6 °F (37 °C)    TempSrc: Oral    SpO2: 98%    Weight: 59.4 kg (131 lb)    Height: 4' 11" (1.499 m)      Body mass index is 26.46 kg/m².  Physical Exam  Constitutional:       Appearance: Normal appearance.   Eyes:      Extraocular Movements: Extraocular movements intact.   Cardiovascular:      Rate and Rhythm: Normal rate and regular rhythm.      Pulses: Normal pulses.      Heart sounds: Normal heart sounds.   Pulmonary:      Effort: Pulmonary effort is normal.      Breath sounds: Normal breath sounds.   Abdominal:      Palpations: Abdomen is soft.      Tenderness: There is no abdominal tenderness. There is no guarding or rebound.   Musculoskeletal:      Right lower leg: No edema.      Left lower leg: No edema.   Skin:     General: Skin is warm and dry.   Neurological:      Mental Status: She is alert and oriented to person, place, and time. Mental status is at baseline.      Gait: Gait abnormal.   Psychiatric:         Mood and Affect: Mood normal.             Diagnoses and health risks identified today and associated recommendations/orders:    1. Encounter " for initial annual wellness visit (AWV) in Medicare patient  -recommend annual wellness exam    2. Primary hypertension  -continue current treatment plan  -low salt diet and exercise  -patient reports monitors bp at home some times but is elevated  -return in 4 weeks for bp follow up  - CBC Auto Differential; Future  - Comprehensive Metabolic Panel; Future  - CBC Auto Differential  - Comprehensive Metabolic Panel    3. Mixed hyperlipidemia  -continue current treatment plan  -low cholesterol diet and exercise  - Lipid Panel; Future  - Lipid Panel    4. Hypothyroidism, unspecified type  -continue current treatment plan    - TSH; Future  - TSH    5. Encounter for preventive health examination  -routine chronic disease follow up    6. Encounter for long-term current use of medication    - Hemoglobin A1C; Future  - Hemoglobin A1C    7. BMI 26.0-26.9,adult  -diet and exercise    -patient declines vaccines and screenings  -referral placed for [facility] neurology per her previous neurology clinic    Provided Coco with a 5-10 year written screening schedule and personal prevention plan. Recommendations were developed using the USPSTF age appropriate recommendations. Education, counseling, and referrals were provided as needed. After Visit Summary printed and given to patient which includes a list of additional screenings\tests needed.    Declines colorectal, mammogram, bone density and hepatitis c screening  Declines flu, pneumonia, tetanus, covid booster, shingles  and rsv vaccines     No follow-ups on file.    KIA Kessler      I offered to discuss advanced care planning, including how to pick a person who would make decisions for you if you were unable to make them for yourself, called a health care power of , and what kind of decisions you might make such as use of life sustaining treatments such as ventilators and tube feeding when faced with a life limiting illness recorded on a living will that they  will need to know. (How you want to be cared for as you near the end of your natural life)     X  Patient has advanced directives written and agrees to provide copies to the institution.

## 2024-10-17 NOTE — PATIENT INSTRUCTIONS
Counseling and Referral of Other Preventative  (Italic type indicates deductible and co-insurance are waived)    Patient Name: Coco Ramos  Today's Date: 10/17/2024    Health Maintenance       Date Due Completion Date    Hepatitis C Screening Never done ---    TETANUS VACCINE Never done ---    Colorectal Cancer Screening Never done ---    Shingles Vaccine (1 of 2) Never done ---    RSV Vaccine (Age 60+ and Pregnant patients) (1 - Risk 60-74 years 1-dose series) Never done ---    Mammogram 10/18/2019 10/18/2018    Pneumococcal Vaccines (Age 65+) (1 of 1 - PCV) Never done ---    DEXA Scan 10/24/2022 10/24/2019    Hemoglobin A1c (Diabetic Prevention Screening) 03/04/2024 3/4/2021    Influenza Vaccine (1) 09/01/2024 4/11/2024 (Declined)    Override on 4/11/2024: Declined    COVID-19 Vaccine (3 - 2024-25 season) 09/01/2024 4/2/2021    Lipid Panel 04/11/2029 4/11/2024        No orders of the defined types were placed in this encounter.    The following information is provided to all patients.  This information is to help you find resources for any of the problems found today that may be affecting your health:                  Living healthy guide: ms.gov    Understanding Diabetes: www.diabetes.org      Eating healthy: www.cdc.gov/healthyweight      CDC home safety checklist: www.cdc.gov/steadi/patient.html      Agency on Aging: ms.gov    Alcoholics anonymous (AA): www.aa.org      Physical Activity: www.harris.nih.gov/ii6ejrt      Tobacco use: ms.gov

## 2024-10-18 ENCOUNTER — TELEPHONE (OUTPATIENT)
Dept: FAMILY MEDICINE | Facility: CLINIC | Age: 69
End: 2024-10-18
Payer: MEDICARE

## 2024-10-18 DIAGNOSIS — E03.8 TSH DEFICIENCY: ICD-10-CM

## 2024-10-18 DIAGNOSIS — R79.89 LOW TSH LEVEL: Primary | ICD-10-CM

## 2024-10-18 NOTE — TELEPHONE ENCOUNTER
----- Message from KIA Hoang sent at 10/18/2024  8:08 AM CDT -----  Triglycerides borderline elevated. Could be related to nonfasting. Low cholesterol diet.  Tsh mildly low. Continue current dose. Repeat tsh lab only in 8 weeks.   Otherwise labs stable

## 2024-10-18 NOTE — TELEPHONE ENCOUNTER
Contacted pt in regards to lab results. Informed pt of lab results. Pt voiced understanding and had no further questions.     ----- Message from KIA Hoang sent at 10/18/2024  8:08 AM CDT -----  Triglycerides borderline elevated. Could be related to nonfasting. Low cholesterol diet.  Tsh mildly low. Continue current dose. Repeat tsh lab only in 8 weeks.   Otherwise labs stable

## 2024-10-29 ENCOUNTER — PATIENT OUTREACH (OUTPATIENT)
Facility: HOSPITAL | Age: 69
End: 2024-10-29
Payer: MEDICARE

## 2024-12-13 ENCOUNTER — TELEPHONE (OUTPATIENT)
Dept: FAMILY MEDICINE | Facility: CLINIC | Age: 69
End: 2024-12-13
Payer: MEDICARE

## 2024-12-13 NOTE — TELEPHONE ENCOUNTER
Contacted pt spouse in regards to pt lab results. Informed pt spouse of results. Spouse voiced understanding and had no further questions.     ----- Message from KIA Hoang sent at 12/13/2024  8:06 AM CST -----  Tsh normal

## 2024-12-31 ENCOUNTER — PATIENT OUTREACH (OUTPATIENT)
Facility: HOSPITAL | Age: 69
End: 2024-12-31
Payer: MEDICARE

## 2024-12-31 NOTE — PROGRESS NOTES
Population Health Chart Review & Patient Outreach Details    Updates Requested / Reviewed:    [x]  Care Everywhere Updated & Reviewed  [x]   Reviewed      Health Maintenance Topics Addressed and Outreach Outcomes / Actions Taken:  Breast Cancer Screening [x] No documentation found in media or Care Everywhere for mammogram.  Patient declined mammo on 10/17/2024.     [x] Comment placed in chart and upcoming appt note that pt needs this.

## 2025-01-29 DIAGNOSIS — I10 PRIMARY HYPERTENSION: Chronic | ICD-10-CM

## 2025-01-29 RX ORDER — LISINOPRIL 30 MG/1
30 TABLET ORAL
Qty: 90 TABLET | Refills: 3 | Status: SHIPPED | OUTPATIENT
Start: 2025-01-29

## 2025-02-09 DIAGNOSIS — E78.2 MIXED HYPERLIPIDEMIA: ICD-10-CM

## 2025-02-10 RX ORDER — PRAVASTATIN SODIUM 40 MG/1
40 TABLET ORAL NIGHTLY
Qty: 90 TABLET | Refills: 3 | Status: SHIPPED | OUTPATIENT
Start: 2025-02-10

## 2025-02-11 RX ORDER — LEVOTHYROXINE SODIUM 75 UG/1
75 TABLET ORAL
Qty: 90 TABLET | Refills: 1 | Status: SHIPPED | OUTPATIENT
Start: 2025-02-11

## 2025-02-11 NOTE — TELEPHONE ENCOUNTER
----- Message from Med Assistant Loida sent at 2/11/2025  8:26 AM CST -----  Regarding: RX Refill  Patient called needing refills on Synthroid to be sent to Pharmacy on file.

## 2025-02-12 ENCOUNTER — PATIENT OUTREACH (OUTPATIENT)
Facility: HOSPITAL | Age: 70
End: 2025-02-12
Payer: MEDICARE

## 2025-02-12 NOTE — PROGRESS NOTES
Population Health Chart Review & Patient Outreach Details    Updates Requested / Reviewed:    [x]  Care Everywhere Updated & Reviewed  [x]   Reviewed      Health Maintenance Topics Addressed and Outreach Outcomes / Actions Taken:  Colon Cancer Screening [x] No documentation found in [facility] or Care Everywhere for colonoscopy. Comment placed in chart that patient needs this.

## 2025-06-11 ENCOUNTER — OFFICE VISIT (OUTPATIENT)
Dept: FAMILY MEDICINE | Facility: CLINIC | Age: 70
End: 2025-06-11
Payer: MEDICARE

## 2025-06-11 VITALS
SYSTOLIC BLOOD PRESSURE: 178 MMHG | RESPIRATION RATE: 18 BRPM | HEIGHT: 59 IN | OXYGEN SATURATION: 95 % | HEART RATE: 79 BPM | TEMPERATURE: 97 F | WEIGHT: 135 LBS | BODY MASS INDEX: 27.21 KG/M2 | DIASTOLIC BLOOD PRESSURE: 70 MMHG

## 2025-06-11 DIAGNOSIS — I10 PRIMARY HYPERTENSION: Primary | ICD-10-CM

## 2025-06-11 DIAGNOSIS — R26.81 UNSTEADY GAIT: ICD-10-CM

## 2025-06-11 DIAGNOSIS — E03.9 HYPOTHYROIDISM, UNSPECIFIED TYPE: ICD-10-CM

## 2025-06-11 DIAGNOSIS — E78.2 MIXED HYPERLIPIDEMIA: ICD-10-CM

## 2025-06-11 DIAGNOSIS — G40.909 SEIZURE DISORDER: Chronic | ICD-10-CM

## 2025-06-11 LAB
ALBUMIN SERPL BCP-MCNC: 4.2 G/DL (ref 3.4–4.8)
ALBUMIN/GLOB SERPL: 1.4 {RATIO}
ALP SERPL-CCNC: 85 U/L (ref 40–150)
ALT SERPL W P-5'-P-CCNC: 23 U/L
ANION GAP SERPL CALCULATED.3IONS-SCNC: 10 MMOL/L (ref 7–16)
AST SERPL W P-5'-P-CCNC: 24 U/L (ref 11–45)
BASOPHILS # BLD AUTO: 0.06 K/UL (ref 0–0.2)
BASOPHILS NFR BLD AUTO: 0.6 % (ref 0–1)
BILIRUB SERPL-MCNC: 0.3 MG/DL
BUN SERPL-MCNC: 17 MG/DL (ref 10–20)
BUN/CREAT SERPL: 20 (ref 6–20)
CALCIUM SERPL-MCNC: 9.1 MG/DL (ref 8.4–10.2)
CHLORIDE SERPL-SCNC: 104 MMOL/L (ref 98–107)
CHOLEST SERPL-MCNC: 199 MG/DL
CHOLEST/HDLC SERPL: 3.1 {RATIO}
CO2 SERPL-SCNC: 30 MMOL/L (ref 23–31)
CREAT SERPL-MCNC: 0.86 MG/DL (ref 0.55–1.02)
DIFFERENTIAL METHOD BLD: ABNORMAL
EGFR (NO RACE VARIABLE) (RUSH/TITUS): 74 ML/MIN/1.73M2
EOSINOPHIL # BLD AUTO: 0.18 K/UL (ref 0–0.5)
EOSINOPHIL NFR BLD AUTO: 1.8 % (ref 1–4)
ERYTHROCYTE [DISTWIDTH] IN BLOOD BY AUTOMATED COUNT: 13 % (ref 11.5–14.5)
GLOBULIN SER-MCNC: 2.9 G/DL (ref 2–4)
GLUCOSE SERPL-MCNC: 99 MG/DL (ref 82–115)
HCT VFR BLD AUTO: 41.8 % (ref 38–47)
HDLC SERPL-MCNC: 65 MG/DL (ref 35–60)
HGB BLD-MCNC: 13.4 G/DL (ref 12–16)
IMM GRANULOCYTES # BLD AUTO: 0.05 K/UL (ref 0–0.04)
IMM GRANULOCYTES NFR BLD: 0.5 % (ref 0–0.4)
LDLC SERPL CALC-MCNC: 111 MG/DL
LDLC/HDLC SERPL: 1.7 {RATIO}
LYMPHOCYTES # BLD AUTO: 1.2 K/UL (ref 1–4.8)
LYMPHOCYTES NFR BLD AUTO: 12 % (ref 27–41)
MCH RBC QN AUTO: 31.5 PG (ref 27–31)
MCHC RBC AUTO-ENTMCNC: 32.1 G/DL (ref 32–36)
MCV RBC AUTO: 98.4 FL (ref 80–96)
MONOCYTES # BLD AUTO: 0.63 K/UL (ref 0–0.8)
MONOCYTES NFR BLD AUTO: 6.3 % (ref 2–6)
MPC BLD CALC-MCNC: 11.1 FL (ref 9.4–12.4)
NEUTROPHILS # BLD AUTO: 7.9 K/UL (ref 1.8–7.7)
NEUTROPHILS NFR BLD AUTO: 78.8 % (ref 53–65)
NONHDLC SERPL-MCNC: 134 MG/DL
NRBC # BLD AUTO: 0 X10E3/UL
NRBC, AUTO (.00): 0 %
PLATELET # BLD AUTO: 196 K/UL (ref 150–400)
POTASSIUM SERPL-SCNC: 4.1 MMOL/L (ref 3.5–5.1)
PROT SERPL-MCNC: 7.1 G/DL (ref 5.8–7.6)
RBC # BLD AUTO: 4.25 M/UL (ref 4.2–5.4)
SODIUM SERPL-SCNC: 140 MMOL/L (ref 136–145)
TRIGL SERPL-MCNC: 117 MG/DL (ref 37–140)
TSH SERPL DL<=0.005 MIU/L-ACNC: 1.47 UIU/ML (ref 0.35–4.94)
VLDLC SERPL-MCNC: 23 MG/DL
WBC # BLD AUTO: 10.02 K/UL (ref 4.5–11)

## 2025-06-11 PROCEDURE — 80053 COMPREHEN METABOLIC PANEL: CPT | Mod: ,,, | Performed by: CLINICAL MEDICAL LABORATORY

## 2025-06-11 PROCEDURE — 84443 ASSAY THYROID STIM HORMONE: CPT | Mod: ,,, | Performed by: CLINICAL MEDICAL LABORATORY

## 2025-06-11 PROCEDURE — 85025 COMPLETE CBC W/AUTO DIFF WBC: CPT | Mod: ,,, | Performed by: CLINICAL MEDICAL LABORATORY

## 2025-06-11 PROCEDURE — 99214 OFFICE O/P EST MOD 30 MIN: CPT | Mod: ,,, | Performed by: NURSE PRACTITIONER

## 2025-06-11 PROCEDURE — 80061 LIPID PANEL: CPT | Mod: ,,, | Performed by: CLINICAL MEDICAL LABORATORY

## 2025-06-11 RX ORDER — LEVOTHYROXINE SODIUM 75 UG/1
75 TABLET ORAL
Qty: 90 TABLET | Refills: 1 | Status: SHIPPED | OUTPATIENT
Start: 2025-06-11

## 2025-06-11 RX ORDER — LISINOPRIL 30 MG/1
30 TABLET ORAL DAILY
Qty: 90 TABLET | Refills: 3 | Status: SHIPPED | OUTPATIENT
Start: 2025-06-11

## 2025-06-11 RX ORDER — DULOXETIN HYDROCHLORIDE 60 MG/1
60 CAPSULE, DELAYED RELEASE ORAL 2 TIMES DAILY
Qty: 180 CAPSULE | Status: CANCELLED | OUTPATIENT
Start: 2025-06-11

## 2025-06-11 RX ORDER — GABAPENTIN 300 MG/1
300 CAPSULE ORAL DAILY
Qty: 90 CAPSULE | Status: CANCELLED | OUTPATIENT
Start: 2025-06-11

## 2025-06-11 RX ORDER — PRAVASTATIN SODIUM 40 MG/1
40 TABLET ORAL NIGHTLY
Qty: 90 TABLET | Refills: 3 | Status: SHIPPED | OUTPATIENT
Start: 2025-06-11

## 2025-06-11 RX ORDER — LANOLIN ALCOHOL/MO/W.PET/CERES
1 CREAM (GRAM) TOPICAL DAILY
Qty: 90 TABLET | Status: CANCELLED | OUTPATIENT
Start: 2025-06-11

## 2025-06-11 RX ORDER — LAMOTRIGINE 150 MG/1
25 TABLET ORAL 2 TIMES DAILY
Qty: 180 TABLET | Status: CANCELLED | OUTPATIENT
Start: 2025-06-11

## 2025-06-11 RX ORDER — AMITRIPTYLINE HYDROCHLORIDE 50 MG/1
50 TABLET, FILM COATED ORAL NIGHTLY
Qty: 90 TABLET | Status: CANCELLED | OUTPATIENT
Start: 2025-06-11

## 2025-06-11 RX ORDER — HYDROXYZINE HYDROCHLORIDE 25 MG/1
25 TABLET, FILM COATED ORAL 4 TIMES DAILY
Qty: 360 TABLET | Status: CANCELLED | OUTPATIENT
Start: 2025-06-11

## 2025-06-11 NOTE — PROGRESS NOTES
"Clinic Note    Coco Ramos is a 69 y.o. female     Chief Complaint:   Chief Complaint   Patient presents with    Medication Refill        Subjective:    Patient comes in today for 6 month follow up and medication refills. Pmh-seizure, htn, hyperlipidemia, hypothyroid  Patient states she is established with Dr. Leonard at [facility]. Patient states typically see q6 months. However, reports issues with transportation. Patient admits to abnormal gait and falls. Uses walker and cane. Denies seizure activity.  Patient reports doing well on medication. Denies adverse side effects.  Patient states she does monitor bp at home. Typically controlled at 120s/80s. Occasionally elevated. Admits to med compliance.  Otherwise denies any complaints or concerns.            Allergies:   Review of patient's allergies indicates:  No Known Allergies     Past Medical History:  Past Medical History:   Diagnosis Date    Anxiety     Hypertension     Hyperthyroidism     Seizures         Current Medications:  Current Medications[1]       Review of Systems   Constitutional:  Negative for fever.   Respiratory:  Negative for cough and shortness of breath.    Cardiovascular:  Negative for chest pain, palpitations and leg swelling.   Gastrointestinal:  Negative for abdominal pain, constipation, diarrhea, nausea and vomiting.   Genitourinary:  Negative for dysuria.   Musculoskeletal:  Positive for gait problem.   Neurological:  Positive for tremors. Negative for headaches.          Objective:    BP (!) 178/70 (BP Location: Left arm, Patient Position: Sitting)   Pulse 79   Temp 97.2 °F (36.2 °C) (Oral)   Resp 18   Ht 4' 11" (1.499 m)   Wt 61.2 kg (135 lb)   SpO2 95%   BMI 27.27 kg/m²      Physical Exam  Constitutional:       Appearance: Normal appearance.   Eyes:      Extraocular Movements: Extraocular movements intact.   Cardiovascular:      Rate and Rhythm: Normal rate and regular rhythm.      Pulses: Normal pulses.      Heart sounds: " Normal heart sounds.   Pulmonary:      Effort: Pulmonary effort is normal.      Breath sounds: Normal breath sounds.   Abdominal:      Palpations: Abdomen is soft.      Tenderness: There is no abdominal tenderness. There is no guarding or rebound.   Musculoskeletal:      Right lower leg: No edema.      Left lower leg: No edema.      Comments: Cane   Skin:     General: Skin is warm and dry.   Neurological:      Mental Status: She is alert and oriented to person, place, and time.      Motor: Tremor present.      Gait: Gait abnormal.   Psychiatric:         Mood and Affect: Mood normal.          Assessment and Plan:    1. Primary hypertension    2. Mixed hyperlipidemia    3. Seizure disorder    4. Unsteady gait    5. Hypothyroidism, unspecified type         Primary hypertension  -     lisinopriL (PRINIVIL,ZESTRIL) 30 MG tablet; Take 1 tablet (30 mg total) by mouth once daily.  Dispense: 90 tablet; Refill: 3  -     CBC Auto Differential; Future; Expected date: 06/11/2025  -     Comprehensive Metabolic Panel; Future; Expected date: 06/11/2025  -low salt diet and exercise  -continue to monitor bp at home. If persistently > 140/90 f/u further management. Nurse will call for bp recheck in 2 weeks    Mixed hyperlipidemia  -     pravastatin (PRAVACHOL) 40 MG tablet; Take 1 tablet (40 mg total) by mouth every evening.  Dispense: 90 tablet; Refill: 3  -     Lipid Panel; Future; Expected date: 06/11/2025  -low cholesterol diet and exercise    Seizure disorder  --continue current treatment plan  -f/u with Dr. Horacio le  -assistive device    Hypothyroidism, unspecified type  -     levothyroxine (SYNTHROID) 75 MCG tablet; Take 1 tablet (75 mcg total) by mouth before breakfast.  Dispense: 90 tablet; Refill: 1  -     TSH; Future; Expected date: 06/11/2025      -declines mammogram, c-scope, and dexa    There are no Patient Instructions on file for this visit.   Follow up in about 6 months (around 12/11/2025), or if symptoms  worsen or fail to improve.          [1]   Current Outpatient Medications:     amitriptyline (ELAVIL) 50 MG tablet, Take 50 mg by mouth every evening., Disp: , Rfl:     DULoxetine (CYMBALTA) 60 MG capsule, Take 60 mg by mouth 2 (two) times a day., Disp: , Rfl:     gabapentin (NEURONTIN) 300 MG capsule, Take 300 mg by mouth once daily., Disp: , Rfl:     hydrOXYzine HCL (ATARAX) 25 MG tablet, Take 25 mg by mouth 4 (four) times daily., Disp: , Rfl:     lamoTRIgine (LAMICTAL) 150 MG Tab, Take 25 mg by mouth 2 (two) times a day., Disp: , Rfl:     lamoTRIgine (LAMICTAL) 25 MG tablet, 2 tablets., Disp: , Rfl:     levothyroxine (SYNTHROID) 75 MCG tablet, Take 1 tablet (75 mcg total) by mouth before breakfast., Disp: 90 tablet, Rfl: 1    lisinopriL (PRINIVIL,ZESTRIL) 30 MG tablet, Take 1 tablet (30 mg total) by mouth once daily., Disp: 90 tablet, Rfl: 3    magnesium oxide (MAG-OX) 400 mg (241.3 mg magnesium) tablet, Take 1 tablet by mouth once daily., Disp: , Rfl:     naproxen sodium (ANAPROX) 220 MG tablet, Take 220 mg by mouth every 12 (twelve) hours as needed., Disp: , Rfl:     pravastatin (PRAVACHOL) 40 MG tablet, Take 1 tablet (40 mg total) by mouth every evening., Disp: 90 tablet, Rfl: 3

## 2025-06-12 ENCOUNTER — RESULTS FOLLOW-UP (OUTPATIENT)
Dept: FAMILY MEDICINE | Facility: CLINIC | Age: 70
End: 2025-06-12

## 2025-07-11 ENCOUNTER — TELEPHONE (OUTPATIENT)
Dept: FAMILY MEDICINE | Facility: CLINIC | Age: 70
End: 2025-07-11
Payer: MEDICARE

## 2025-07-11 VITALS — DIASTOLIC BLOOD PRESSURE: 82 MMHG | SYSTOLIC BLOOD PRESSURE: 127 MMHG

## 2025-07-11 NOTE — TELEPHONE ENCOUNTER
Contacted pt in regards to BP during previous office visit. Pt reports normal readings at home such as 127/82. Recommended to patient tot continue to monitor her BP and record her readings. Also recommended to patient to RTC if BP becomes elevated. Pt voiced understanding and had no further questions.

## 2025-08-25 ENCOUNTER — OFFICE VISIT (OUTPATIENT)
Dept: FAMILY MEDICINE | Facility: CLINIC | Age: 70
End: 2025-08-25
Payer: MEDICARE

## 2025-08-25 VITALS
HEIGHT: 59 IN | RESPIRATION RATE: 18 BRPM | HEART RATE: 79 BPM | TEMPERATURE: 99 F | DIASTOLIC BLOOD PRESSURE: 80 MMHG | BODY MASS INDEX: 27.62 KG/M2 | SYSTOLIC BLOOD PRESSURE: 166 MMHG | OXYGEN SATURATION: 96 % | WEIGHT: 137 LBS

## 2025-08-25 DIAGNOSIS — R30.0 DYSURIA: Primary | ICD-10-CM

## 2025-08-25 DIAGNOSIS — I10 PRIMARY HYPERTENSION: Chronic | ICD-10-CM

## 2025-08-25 LAB
BILIRUB SERPL-MCNC: NORMAL MG/DL
BLOOD URINE, POC: NORMAL
CLARITY, UA: NORMAL
COLOR, UA: NORMAL
GLUCOSE UR QL STRIP: NORMAL
KETONES UR QL STRIP: NORMAL
LEUKOCYTE ESTERASE URINE, POC: NORMAL
NITRITE, POC UA: NORMAL
PH, POC UA: 7
PROTEIN, POC: NORMAL
SPECIFIC GRAVITY, POC UA: 1.01
UROBILINOGEN, POC UA: 0.2

## 2025-08-25 PROCEDURE — 87086 URINE CULTURE/COLONY COUNT: CPT | Mod: ,,, | Performed by: CLINICAL MEDICAL LABORATORY

## 2025-08-25 PROCEDURE — 87077 CULTURE AEROBIC IDENTIFY: CPT | Mod: ,,, | Performed by: CLINICAL MEDICAL LABORATORY

## 2025-08-25 PROCEDURE — 87186 SC STD MICRODIL/AGAR DIL: CPT | Mod: ,,, | Performed by: CLINICAL MEDICAL LABORATORY

## 2025-08-25 PROCEDURE — 99213 OFFICE O/P EST LOW 20 MIN: CPT | Mod: ,,, | Performed by: NURSE PRACTITIONER

## 2025-08-25 RX ORDER — LISINOPRIL 40 MG/1
40 TABLET ORAL DAILY
Qty: 90 TABLET | Refills: 1 | Status: SHIPPED | OUTPATIENT
Start: 2025-08-25 | End: 2025-08-27 | Stop reason: SDUPTHER

## 2025-08-27 ENCOUNTER — RESULTS FOLLOW-UP (OUTPATIENT)
Dept: FAMILY MEDICINE | Facility: CLINIC | Age: 70
End: 2025-08-27
Payer: MEDICARE

## 2025-08-27 DIAGNOSIS — I10 PRIMARY HYPERTENSION: Chronic | ICD-10-CM

## 2025-08-27 LAB — UA COMPLETE W REFLEX CULTURE PNL UR: ABNORMAL

## 2025-08-27 RX ORDER — NITROFURANTOIN 25; 75 MG/1; MG/1
100 CAPSULE ORAL 2 TIMES DAILY
Qty: 14 CAPSULE | Refills: 0 | Status: SHIPPED | OUTPATIENT
Start: 2025-08-27 | End: 2025-09-03

## 2025-08-27 RX ORDER — LISINOPRIL 40 MG/1
40 TABLET ORAL DAILY
Qty: 90 TABLET | Refills: 1 | Status: SHIPPED | OUTPATIENT
Start: 2025-08-27 | End: 2026-08-27